# Patient Record
Sex: FEMALE | Race: WHITE | Employment: FULL TIME | URBAN - METROPOLITAN AREA
[De-identification: names, ages, dates, MRNs, and addresses within clinical notes are randomized per-mention and may not be internally consistent; named-entity substitution may affect disease eponyms.]

---

## 2017-03-03 ENCOUNTER — HOSPITAL ENCOUNTER (EMERGENCY)
Age: 47
Discharge: HOME | End: 2017-03-04
Attending: EMERGENCY MEDICINE
Payer: COMMERCIAL

## 2017-03-03 VITALS
OXYGEN SATURATION: 99 % | SYSTOLIC BLOOD PRESSURE: 114 MMHG | DIASTOLIC BLOOD PRESSURE: 79 MMHG | HEIGHT: 65 IN | BODY MASS INDEX: 20.83 KG/M2 | WEIGHT: 125 LBS | HEART RATE: 91 BPM | TEMPERATURE: 97.5 F

## 2017-03-03 DIAGNOSIS — K02.9 DENTAL CARIES INTO PULP: Primary | ICD-10-CM

## 2017-03-03 PROCEDURE — 6370000000 HC RX 637 (ALT 250 FOR IP): Performed by: EMERGENCY MEDICINE

## 2017-03-03 PROCEDURE — 99282 EMERGENCY DEPT VISIT SF MDM: CPT

## 2017-03-03 RX ORDER — HYDROCODONE BITARTRATE AND ACETAMINOPHEN 5; 325 MG/1; MG/1
1 TABLET ORAL EVERY 6 HOURS PRN
Qty: 20 TABLET | Refills: 0 | Status: SHIPPED | OUTPATIENT
Start: 2017-03-03 | End: 2017-03-10

## 2017-03-03 RX ORDER — IBUPROFEN 600 MG/1
600 TABLET ORAL ONCE
Status: COMPLETED | OUTPATIENT
Start: 2017-03-03 | End: 2017-03-03

## 2017-03-03 RX ORDER — CLINDAMYCIN HYDROCHLORIDE 150 MG/1
150 CAPSULE ORAL 3 TIMES DAILY
Qty: 21 CAPSULE | Refills: 0 | Status: SHIPPED | OUTPATIENT
Start: 2017-03-03 | End: 2017-03-13

## 2017-03-03 RX ADMIN — IBUPROFEN 600 MG: 600 TABLET ORAL at 23:59

## 2017-03-03 ASSESSMENT — PAIN SCALES - GENERAL
PAINLEVEL_OUTOF10: 9
PAINLEVEL_OUTOF10: 10

## 2017-03-03 ASSESSMENT — PAIN DESCRIPTION - LOCATION: LOCATION: TEETH

## 2017-03-03 ASSESSMENT — PAIN DESCRIPTION - DESCRIPTORS: DESCRIPTORS: ACHING;CONSTANT

## 2017-05-14 ENCOUNTER — APPOINTMENT (OUTPATIENT)
Dept: GENERAL RADIOLOGY | Age: 47
End: 2017-05-14
Payer: COMMERCIAL

## 2017-05-14 ENCOUNTER — HOSPITAL ENCOUNTER (EMERGENCY)
Age: 47
Discharge: HOME | End: 2017-05-14
Payer: COMMERCIAL

## 2017-05-14 VITALS
DIASTOLIC BLOOD PRESSURE: 94 MMHG | WEIGHT: 122 LBS | RESPIRATION RATE: 16 BRPM | BODY MASS INDEX: 20.33 KG/M2 | HEART RATE: 93 BPM | SYSTOLIC BLOOD PRESSURE: 145 MMHG | OXYGEN SATURATION: 96 % | HEIGHT: 65 IN | TEMPERATURE: 98 F

## 2017-05-14 DIAGNOSIS — M79.641 RIGHT HAND PAIN: Primary | ICD-10-CM

## 2017-05-14 DIAGNOSIS — S63.501A RIGHT WRIST SPRAIN, INITIAL ENCOUNTER: ICD-10-CM

## 2017-05-14 PROCEDURE — 99283 EMERGENCY DEPT VISIT LOW MDM: CPT

## 2017-05-14 PROCEDURE — 6370000000 HC RX 637 (ALT 250 FOR IP): Performed by: PHYSICIAN ASSISTANT

## 2017-05-14 PROCEDURE — 73130 X-RAY EXAM OF HAND: CPT

## 2017-05-14 PROCEDURE — 73110 X-RAY EXAM OF WRIST: CPT

## 2017-05-14 RX ORDER — IBUPROFEN 600 MG/1
600 TABLET ORAL ONCE
Qty: 15 TABLET | Refills: 0 | Status: SHIPPED | OUTPATIENT
Start: 2017-05-14 | End: 2019-05-15 | Stop reason: ALTCHOICE

## 2017-05-14 RX ORDER — IBUPROFEN 600 MG/1
600 TABLET ORAL ONCE
Status: COMPLETED | OUTPATIENT
Start: 2017-05-14 | End: 2017-05-14

## 2017-05-14 RX ADMIN — IBUPROFEN 600 MG: 600 TABLET ORAL at 14:13

## 2017-05-14 ASSESSMENT — PAIN DESCRIPTION - ORIENTATION: ORIENTATION: RIGHT

## 2017-05-14 ASSESSMENT — PAIN DESCRIPTION - FREQUENCY: FREQUENCY: CONTINUOUS

## 2017-05-14 ASSESSMENT — PAIN DESCRIPTION - LOCATION: LOCATION: WRIST

## 2017-05-14 ASSESSMENT — PAIN DESCRIPTION - DESCRIPTORS: DESCRIPTORS: THROBBING;BURNING;ACHING

## 2017-05-14 ASSESSMENT — ENCOUNTER SYMPTOMS
RESPIRATORY NEGATIVE: 1
EYES NEGATIVE: 1
GASTROINTESTINAL NEGATIVE: 1

## 2017-05-14 ASSESSMENT — PAIN SCALES - GENERAL
PAINLEVEL_OUTOF10: 9
PAINLEVEL_OUTOF10: 9

## 2017-05-14 ASSESSMENT — PAIN DESCRIPTION - PAIN TYPE: TYPE: ACUTE PAIN

## 2017-07-14 ENCOUNTER — HOSPITAL ENCOUNTER (OUTPATIENT)
Dept: WOMENS IMAGING | Age: 47
Discharge: HOME | End: 2017-07-14
Payer: COMMERCIAL

## 2017-07-14 DIAGNOSIS — Z12.31 ENCOUNTER FOR SCREENING MAMMOGRAM FOR BREAST CANCER: ICD-10-CM

## 2017-07-14 PROCEDURE — G0202 SCR MAMMO BI INCL CAD: HCPCS

## 2017-12-18 ENCOUNTER — APPOINTMENT (OUTPATIENT)
Dept: GENERAL RADIOLOGY | Age: 47
End: 2017-12-18
Payer: COMMERCIAL

## 2017-12-18 ENCOUNTER — HOSPITAL ENCOUNTER (EMERGENCY)
Age: 47
Discharge: HOME | End: 2017-12-18
Payer: COMMERCIAL

## 2017-12-18 VITALS
SYSTOLIC BLOOD PRESSURE: 133 MMHG | DIASTOLIC BLOOD PRESSURE: 78 MMHG | HEART RATE: 95 BPM | OXYGEN SATURATION: 97 % | RESPIRATION RATE: 16 BRPM | TEMPERATURE: 97.9 F

## 2017-12-18 DIAGNOSIS — S39.012A STRAIN OF LUMBAR REGION, INITIAL ENCOUNTER: Primary | ICD-10-CM

## 2017-12-18 PROCEDURE — 96372 THER/PROPH/DIAG INJ SC/IM: CPT

## 2017-12-18 PROCEDURE — 6360000002 HC RX W HCPCS: Performed by: PHYSICIAN ASSISTANT

## 2017-12-18 PROCEDURE — 73502 X-RAY EXAM HIP UNI 2-3 VIEWS: CPT

## 2017-12-18 PROCEDURE — 99283 EMERGENCY DEPT VISIT LOW MDM: CPT

## 2017-12-18 RX ORDER — ORPHENADRINE CITRATE 30 MG/ML
60 INJECTION INTRAMUSCULAR; INTRAVENOUS ONCE
Status: COMPLETED | OUTPATIENT
Start: 2017-12-18 | End: 2017-12-18

## 2017-12-18 RX ORDER — HYDROCODONE BITARTRATE AND ACETAMINOPHEN 5; 325 MG/1; MG/1
1 TABLET ORAL EVERY 6 HOURS PRN
Qty: 10 TABLET | Refills: 0 | Status: SHIPPED | OUTPATIENT
Start: 2017-12-18 | End: 2017-12-25

## 2017-12-18 RX ORDER — CYCLOBENZAPRINE HCL 10 MG
10 TABLET ORAL 3 TIMES DAILY PRN
Qty: 15 TABLET | Refills: 0 | Status: SHIPPED | OUTPATIENT
Start: 2017-12-18 | End: 2017-12-28

## 2017-12-18 RX ORDER — METHYLPREDNISOLONE ACETATE 80 MG/ML
80 INJECTION, SUSPENSION INTRA-ARTICULAR; INTRALESIONAL; INTRAMUSCULAR; SOFT TISSUE ONCE
Status: COMPLETED | OUTPATIENT
Start: 2017-12-18 | End: 2017-12-18

## 2017-12-18 RX ORDER — KETOROLAC TROMETHAMINE 30 MG/ML
60 INJECTION, SOLUTION INTRAMUSCULAR; INTRAVENOUS ONCE
Status: COMPLETED | OUTPATIENT
Start: 2017-12-18 | End: 2017-12-18

## 2017-12-18 RX ADMIN — KETOROLAC TROMETHAMINE 60 MG: 30 INJECTION, SOLUTION INTRAMUSCULAR at 20:05

## 2017-12-18 RX ADMIN — ORPHENADRINE CITRATE 60 MG: 30 INJECTION INTRAMUSCULAR; INTRAVENOUS at 20:04

## 2017-12-18 RX ADMIN — METHYLPREDNISOLONE ACETATE 80 MG: 80 INJECTION, SUSPENSION INTRA-ARTICULAR; INTRALESIONAL; INTRAMUSCULAR; SOFT TISSUE at 20:04

## 2017-12-18 ASSESSMENT — ENCOUNTER SYMPTOMS
COLOR CHANGE: 0
SHORTNESS OF BREATH: 0
TROUBLE SWALLOWING: 0
BACK PAIN: 1
EYE PAIN: 0
ABDOMINAL PAIN: 0
APNEA: 0
ALLERGIC/IMMUNOLOGIC NEGATIVE: 1

## 2017-12-18 ASSESSMENT — PAIN SCALES - GENERAL
PAINLEVEL_OUTOF10: 9
PAINLEVEL_OUTOF10: 9

## 2017-12-18 ASSESSMENT — PAIN DESCRIPTION - ORIENTATION: ORIENTATION: LOWER

## 2017-12-18 ASSESSMENT — PAIN DESCRIPTION - PAIN TYPE: TYPE: ACUTE PAIN

## 2017-12-18 ASSESSMENT — PAIN DESCRIPTION - LOCATION: LOCATION: BACK

## 2017-12-19 NOTE — ED PROVIDER NOTES
[facility] [facility] ED  eMERGENCY dEPARTMENT eNCOUnter      Pt Name: Moises Essex  MRN: [id number]  [place] 1970  Date of evaluation: 12/18/2017  Provider: Anni Galarza PA-C    [address] [place] [address]       Chief Complaint   Patient presents with    Back Pain     fell on ice hurt lower back          HISTORY OF PRESENT ILLNESS  (Location/Symptom, Timing/Onset, Context/Setting, Quality, Duration, Modifying Factors, Severity.)   Moises Essex is a 56 y.o. female who presents to the emergency department Status post mechanical fall on the ice prior to arrival.  Patient states that she was walking outside of her house and she slipped and fell, landing on her right lower back and hip region. Patient able ambulate after the injury. Patient states that pain syndrome the lower back and radiates into the abdomen and buttock region. Patient denies any numbness or tingling. Patient denies any loss of bowel or bladder control. HPI    Nursing Notes were reviewed and I agree. REVIEW OF SYSTEMS    (2-9 systems for level 4, 10 or more for level 5)     Review of Systems   Constitutional: Negative for diaphoresis and fever. HENT: Negative for hearing loss and trouble swallowing. Eyes: Negative for pain. Respiratory: Negative for apnea and shortness of breath. Cardiovascular: Negative for chest pain. Gastrointestinal: Negative for abdominal pain. Endocrine: Negative. Genitourinary: Negative for hematuria. Musculoskeletal: Positive for back pain. Negative for neck pain and neck stiffness. Skin: Negative for color change. Allergic/Immunologic: Negative. Neurological: Negative for dizziness and numbness. Hematological: Negative. Psychiatric/Behavioral: Negative. All other systems reviewed and are negative. Except as noted above the remainder of the review of systems was reviewed and negative.        PAST MEDICAL HISTORY     Past Medical History:   Diagnosis Date    Carpal tunnel tobacco: Never Used    Alcohol use Yes      Comment: occasionally    Drug use: No    Sexual activity: Not Currently     Partners: Male     Other Topics Concern    Not on file     Social History Narrative    No narrative on file       SCREENINGS           PHYSICAL EXAM    (up to 7 for level 4, 8 or more for level 5)     ED Triage Vitals [12/18/17 1936]   BP Temp Temp Source Pulse Resp SpO2 Height Weight   133/78 97.9 °F (36.6 °C) Oral 95 16 97 % -- --       Physical Exam   Constitutional: She is oriented to person, place, and time. She appears well-developed and well-nourished. No distress. HENT:   Head: Normocephalic and atraumatic. Mouth/Throat: No oropharyngeal exudate. Eyes: Conjunctivae and EOM are normal. Pupils are equal, round, and reactive to light. No scleral icterus. Neck: Normal range of motion. Neck supple. No tracheal deviation present. Cardiovascular: Normal rate, normal heart sounds and intact distal pulses. Pulmonary/Chest: Effort normal and breath sounds normal. No respiratory distress. Abdominal: Soft. Bowel sounds are normal. She exhibits no distension. Musculoskeletal: Normal range of motion. Lumbar back: She exhibits tenderness, pain and spasm. She exhibits no edema and no deformity. Back:    Neurological: She is alert and oriented to person, place, and time. No cranial nerve deficit. She exhibits normal muscle tone. Skin: Skin is warm and dry. No rash noted. She is not diaphoretic. No erythema. Psychiatric: She has a normal mood and affect. Her behavior is normal. Judgment and thought content normal.   Nursing note and vitals reviewed.         DIAGNOSTIC RESULTS     RADIOLOGY:   Non-plain film images such as CT, Ultrasound and MRI are read by the radiologist. Plain radiographic images are visualized and preliminarily interpreted by Jeevan Kyle PA-C with the below findings:        Interpretation per the Radiologist below, if available at the time of this note:    XR HIP 2-3 VW W PELVIS RIGHT    (Results Pending)       LABS:  Labs Reviewed - No data to display    All other labs were within normal range or not returned as of this dictation. EMERGENCY DEPARTMENT COURSE and DIFFERENTIAL DIAGNOSIS/MDM:   Vitals:    Vitals:    12/18/17 1936   BP: 133/78   Pulse: 95   Resp: 16   Temp: 97.9 °F (36.6 °C)   TempSrc: Oral   SpO2: 97%       REASSESSMENT     ED Course        No fx on xray. Will tx with muscle relaxants, pain control and PCP follow up. MDM    PROCEDURES:    Procedures      FINAL IMPRESSION      1. Strain of lumbar region, initial encounter          DISPOSITION/PLAN   DISPOSITION Decision to Discharge    PATIENT REFERRED TO:  [facility]  [address]  [phone]  Call in 1 day        DISCHARGE MEDICATIONS:  New Prescriptions    CYCLOBENZAPRINE (FLEXERIL) 10 MG TABLET    Take 1 tablet by mouth 3 times daily as needed for Muscle spasms    HYDROCODONE-ACETAMINOPHEN (NORCO) 5-325 MG PER TABLET    Take 1 tablet by mouth every 6 hours as needed for Pain .        (Please note that portions of this note were completed with a voice recognition program.  Efforts were made to edit the dictations but occasionally words are mis-transcribed.)    MARILYN Hanley PA-C  12/18/17 2047

## 2019-05-07 PROBLEM — M48.07 LUMBOSACRAL STENOSIS WITH NEUROGENIC CLAUDICATION (HCC): Status: ACTIVE | Noted: 2019-05-07

## 2019-05-07 PROBLEM — M50.223 HERNIATED NUCLEUS PULPOSUS, C6-7: Status: ACTIVE | Noted: 2019-05-07

## 2019-05-07 PROBLEM — G95.19 LUMBOSACRAL STENOSIS WITH NEUROGENIC CLAUDICATION (HCC): Status: ACTIVE | Noted: 2019-05-07

## 2019-05-07 PROBLEM — M47.23 OSTEOARTHRITIS OF SPINE WITH RADICULOPATHY, CERVICOTHORACIC REGION: Status: ACTIVE | Noted: 2019-05-07

## 2019-05-15 ENCOUNTER — HOSPITAL ENCOUNTER (EMERGENCY)
Age: 49
Discharge: HOME | End: 2019-05-15
Attending: EMERGENCY MEDICINE
Payer: COMMERCIAL

## 2019-05-15 ENCOUNTER — APPOINTMENT (OUTPATIENT)
Dept: GENERAL RADIOLOGY | Age: 49
End: 2019-05-15
Payer: COMMERCIAL

## 2019-05-15 VITALS
WEIGHT: 110 LBS | HEART RATE: 78 BPM | SYSTOLIC BLOOD PRESSURE: 118 MMHG | RESPIRATION RATE: 18 BRPM | HEIGHT: 63 IN | OXYGEN SATURATION: 100 % | BODY MASS INDEX: 19.49 KG/M2 | DIASTOLIC BLOOD PRESSURE: 68 MMHG | TEMPERATURE: 98 F

## 2019-05-15 DIAGNOSIS — S39.012A STRAIN OF LUMBAR REGION, INITIAL ENCOUNTER: Primary | ICD-10-CM

## 2019-05-15 PROCEDURE — 6360000002 HC RX W HCPCS: Performed by: EMERGENCY MEDICINE

## 2019-05-15 PROCEDURE — 96372 THER/PROPH/DIAG INJ SC/IM: CPT

## 2019-05-15 PROCEDURE — 99283 EMERGENCY DEPT VISIT LOW MDM: CPT

## 2019-05-15 PROCEDURE — 72110 X-RAY EXAM L-2 SPINE 4/>VWS: CPT

## 2019-05-15 RX ORDER — IBUPROFEN 600 MG/1
600 TABLET ORAL EVERY 8 HOURS PRN
Qty: 30 TABLET | Refills: 0 | Status: SHIPPED | OUTPATIENT
Start: 2019-05-15

## 2019-05-15 RX ORDER — CYCLOBENZAPRINE HCL 5 MG
5 TABLET ORAL 3 TIMES DAILY PRN
Qty: 12 TABLET | Refills: 0 | Status: SHIPPED | OUTPATIENT
Start: 2019-05-15

## 2019-05-15 RX ORDER — OXYCODONE HYDROCHLORIDE AND ACETAMINOPHEN 5; 325 MG/1; MG/1
1 TABLET ORAL EVERY 6 HOURS PRN
Qty: 10 TABLET | Refills: 0 | Status: SHIPPED | OUTPATIENT
Start: 2019-05-15 | End: 2019-05-18

## 2019-05-15 RX ORDER — KETOROLAC TROMETHAMINE 30 MG/ML
30 INJECTION, SOLUTION INTRAMUSCULAR; INTRAVENOUS ONCE
Status: COMPLETED | OUTPATIENT
Start: 2019-05-15 | End: 2019-05-15

## 2019-05-15 RX ADMIN — KETOROLAC TROMETHAMINE 30 MG: 30 INJECTION, SOLUTION INTRAMUSCULAR; INTRAVENOUS at 07:28

## 2019-05-15 ASSESSMENT — ENCOUNTER SYMPTOMS
ALLERGIC/IMMUNOLOGIC NEGATIVE: 1
WHEEZING: 0
SHORTNESS OF BREATH: 0
ABDOMINAL PAIN: 0
BACK PAIN: 1
NAUSEA: 0
VOMITING: 0
EYES NEGATIVE: 1

## 2019-05-15 ASSESSMENT — PAIN DESCRIPTION - PROGRESSION: CLINICAL_PROGRESSION: GRADUALLY WORSENING

## 2019-05-15 ASSESSMENT — PAIN SCALES - GENERAL
PAINLEVEL_OUTOF10: 9
PAINLEVEL_OUTOF10: 9

## 2019-05-15 ASSESSMENT — PAIN DESCRIPTION - ONSET: ONSET: ON-GOING

## 2019-05-15 ASSESSMENT — PAIN DESCRIPTION - PAIN TYPE: TYPE: ACUTE PAIN

## 2019-05-15 ASSESSMENT — PAIN DESCRIPTION - DESCRIPTORS: DESCRIPTORS: ACHING

## 2019-05-15 ASSESSMENT — PAIN DESCRIPTION - LOCATION: LOCATION: BACK;LEG

## 2019-05-15 ASSESSMENT — PAIN DESCRIPTION - FREQUENCY: FREQUENCY: INTERMITTENT

## 2019-05-15 ASSESSMENT — PAIN DESCRIPTION - ORIENTATION: ORIENTATION: RIGHT;LEFT

## 2019-05-15 NOTE — ED NOTES
Patient returned from x-ray. No distress noted. Given call light.  Sitting up in bed watching tv.      Brooke Peña RN  05/15/19 0373

## 2019-05-15 NOTE — ED NOTES
Registration at bedside. Patient then taken to x-ray via w/c. Changed into gown. Tolerated well.       Vincenzo Alvarado RN  05/15/19 8265

## 2019-05-15 NOTE — ED TRIAGE NOTES
Pt states lower right back pain x 2 weeks after falling on tile floor in bathroom. Pt landed on buttocks. No LOC. Denies hitting head. Tingling noted down right leg. Aleve last taken yesterday at 2200.
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
## 2019-05-28 ENCOUNTER — APPOINTMENT (OUTPATIENT)
Dept: GENERAL RADIOLOGY | Age: 49
End: 2019-05-28
Payer: COMMERCIAL

## 2019-05-28 ENCOUNTER — HOSPITAL ENCOUNTER (EMERGENCY)
Age: 49
Discharge: LEFT BEFORE DISCHARGE | End: 2019-05-28
Payer: COMMERCIAL

## 2019-05-28 VITALS
DIASTOLIC BLOOD PRESSURE: 85 MMHG | WEIGHT: 110 LBS | SYSTOLIC BLOOD PRESSURE: 124 MMHG | BODY MASS INDEX: 19.49 KG/M2 | RESPIRATION RATE: 16 BRPM | HEIGHT: 63 IN | OXYGEN SATURATION: 98 % | TEMPERATURE: 97.7 F | HEART RATE: 96 BPM

## 2019-05-28 DIAGNOSIS — G89.29 CHRONIC RIGHT-SIDED LOW BACK PAIN WITHOUT SCIATICA: Primary | ICD-10-CM

## 2019-05-28 DIAGNOSIS — M54.50 CHRONIC RIGHT-SIDED LOW BACK PAIN WITHOUT SCIATICA: Primary | ICD-10-CM

## 2019-05-28 PROCEDURE — 72170 X-RAY EXAM OF PELVIS: CPT

## 2019-05-28 PROCEDURE — 99284 EMERGENCY DEPT VISIT MOD MDM: CPT

## 2019-05-28 PROCEDURE — 72110 X-RAY EXAM L-2 SPINE 4/>VWS: CPT

## 2019-05-28 RX ORDER — DEXAMETHASONE SODIUM PHOSPHATE 10 MG/ML
10 INJECTION INTRAMUSCULAR; INTRAVENOUS ONCE
Status: DISCONTINUED | OUTPATIENT
Start: 2019-05-28 | End: 2019-05-28 | Stop reason: HOSPADM

## 2019-05-28 RX ORDER — KETOROLAC TROMETHAMINE 30 MG/ML
30 INJECTION, SOLUTION INTRAMUSCULAR; INTRAVENOUS ONCE
Status: DISCONTINUED | OUTPATIENT
Start: 2019-05-28 | End: 2019-05-28

## 2019-05-28 ASSESSMENT — PAIN DESCRIPTION - ORIENTATION: ORIENTATION: LOWER

## 2019-05-28 ASSESSMENT — ENCOUNTER SYMPTOMS
SORE THROAT: 0
ABDOMINAL DISTENTION: 0
SHORTNESS OF BREATH: 0
CONSTIPATION: 0
BACK PAIN: 1
RHINORRHEA: 0
EYE DISCHARGE: 0
ABDOMINAL PAIN: 0
COLOR CHANGE: 0

## 2019-05-28 ASSESSMENT — PAIN DESCRIPTION - FREQUENCY: FREQUENCY: CONTINUOUS

## 2019-05-28 ASSESSMENT — PAIN DESCRIPTION - PAIN TYPE: TYPE: ACUTE PAIN;CHRONIC PAIN

## 2019-05-28 ASSESSMENT — PAIN SCALES - GENERAL: PAINLEVEL_OUTOF10: 10

## 2019-05-28 ASSESSMENT — PAIN DESCRIPTION - DESCRIPTORS: DESCRIPTORS: SHARP

## 2019-05-28 ASSESSMENT — PAIN DESCRIPTION - LOCATION: LOCATION: BACK

## 2019-05-28 NOTE — ED NOTES
This nurse went in to medicate patient and patient was not in bed. Called cat scan and xray patient not there.  Patient left [facility], RN  05/28/19 8211

## 2019-05-28 NOTE — ED NOTES
Pt refused toradol as she states that the last time she received it, it made her worse. Will inform physician of this.      Sanjana GIANG RN  05/28/19 9721

## 2019-05-28 NOTE — ED TRIAGE NOTES
Pt reports low back pain since 5/1 after falling in the shower. Pt states she was treated in this ED on 5/15. Pt states she has not been able to get in to see her PMD and he told her to come to the ED. Pt rates back pain \"10/10\". Pt ambulatory to triage with steady gait, pt denies urinary symptoms. Pt alert and oriented, skin p/w/d, resps even and unlabored.   No distress noted

## 2019-05-28 NOTE — ED PROVIDER NOTES
[address] [facility] ED  eMERGENCY dEPARTMENT eNCOUnter      Pt Name: Redd Greene  MRN: [id number]  Dayangfmicheline 1970  Date of evaluation: 5/28/2019  Provider: Javier Lewis PA-C    CHIEF COMPLAINT       Chief Complaint   Patient presents with    Back Pain         HISTORY OF PRESENT ILLNESS   (Location/Symptom, Timing/Onset,Context/Setting, Quality, Duration, Modifying Factors, Severity)  Note limiting factors. Redd Greene is a 51 y.o. female who presents to the emergency department with complaint of low back pain secondary to a fall which patient states occurred on 5/15/2019. Patient states she was seen in the emergency department she was diagnosed as a lumbar contusion she was sent home with pain medication and advised to follow-up with her regular family physician. She states some chronic back pain which has been managed by Dr. Susan Hensley of neurosurgery as well as pain management. She states she has been seen and evaluated by pain management on 5/21/2019 but she states this for upper part of the back and not the lower part of the back, this was with her pain management specialist. and Dr. Susan Hensley. She states that she had fired both of their services and wants to follow-up with someone different. She states she had contacted her regular family physician for follow-up visit but was unable to get scheduled. She states that her regular family physician advised that if she was still having pain to be re-seen and reevaluated in the emergency department. Her current pain at this time as a 10 out of 10 to the low back and right paraspinal region. she states there is no radiation of pain into her buttock or lower extremities. Denies any bowel or bladder dysfunction she denies any saddle paresthesias. She denies any new or acute injury. sHe has past medical history significant for chronic back pain,  fibromyalgia, chronic low back pain, and restless leg syndrome, and degeneration of cervical disc. Pt drove herself to ER. HPI    NursingNotes were reviewed. REVIEW OF SYSTEMS    (2-9 systems for level 4, 10 or more for level 5)     Review of Systems   Constitutional: Negative for activity change and appetite change. HENT: Negative for congestion, ear discharge, ear pain, nosebleeds, rhinorrhea and sore throat. Eyes: Negative for discharge. Respiratory: Negative for shortness of breath. Cardiovascular: Negative for chest pain, palpitations and leg swelling. Gastrointestinal: Negative for abdominal distention, abdominal pain and constipation. Genitourinary: Negative for difficulty urinating and dysuria. Musculoskeletal: Positive for back pain. Negative for arthralgias. Lumbar back pain. Skin: Negative for color change. Neurological: Negative for dizziness, syncope, numbness and headaches. Psychiatric/Behavioral: Negative for agitation and confusion. Except as noted above the remainder of the review of systems was reviewed and negative.        PAST MEDICAL HISTORY     Past Medical History:   Diagnosis Date    Carpal tunnel syndrome on right     Cervical radiculopathy at C6     Chronic back pain     Chronic bronchitis (HCC)     Chronic pain syndrome     Degeneration of cervical intervertebral disc 9/8/2015    Depression     Fibromyalgia     BILATERAL TRAPEZIAL    Fibromyalgia     Headache(784.0)     Low back pain     Restless legs syndrome     Tobacco use disorder          SURGICALHISTORY       Past Surgical History:   Procedure Laterality Date    HYSTERECTOMY  2004         CURRENT MEDICATIONS       Discharge Medication List as of 5/28/2019  8:34 AM      CONTINUE these medications which have NOT CHANGED    Details   cyclobenzaprine (FLEXERIL) 5 MG tablet Take 1 tablet by mouth 3 times daily as needed for Muscle spasms, Disp-12 tablet, R-0Print      ibuprofen (ADVIL;MOTRIN) 600 MG tablet Take 1 tablet by mouth every 8 hours as needed for Pain, Disp-30 tablet, R-0Print      LYRICA 150 MG capsule take 1 capsule by mouth three times a day, Disp-90 capsule, R-5      albuterol (VENTOLIN HFA) 108 (90 BASE) MCG/ACT inhaler Inhale 2 puffs into the lungs every 6 hours as needed for Wheezing., Disp-3 Inhaler, R-3      lidocaine (LIDODERM) 5 % Apply up to 3 patches to painful areas daily. Generic equivalent acceptable, unless otherwise noted. , Disp-90 patch, R-3      fluticasone (FLONASE) 50 MCG/ACT nasal spray 2 sprays each nostril daily. , Disp-1 Bottle, R-1             ALLERGIES     Hydrocodone; Penicillins; and Tramadol    FAMILY HISTORY       Family History   Problem Relation Age of Onset    Arthritis Mother 40    Cancer Mother     Diabetes Mother     Heart Disease Father     Stroke Father     Heart Attack Maternal Aunt             Diabetes Brother     Hypertension Brother     Hypertension Sister 61    Cancer Maternal Grandmother 40    Cancer Maternal Uncle     Heart Disease Paternal Grandmother     Heart Disease Paternal Aunt     High Blood Pressure Sister           SOCIAL HISTORY       Social History     Socioeconomic History    Marital status: Single     Spouse name: None    Number of children: None    Years of education: None    Highest education level: None   Occupational History    None   Social Needs    Financial resource strain: None    Food insecurity:     Worry: None     Inability: None    Transportation needs:     Medical: None     Non-medical: None   Tobacco Use    Smoking status: Current Every Day Smoker     Packs/day: 1.00     Years: 25.00     Pack years: 25.00     Types: Cigarettes    Smokeless tobacco: Never Used   Substance and Sexual Activity    Alcohol use: Not Currently     Comment: occasionally    Drug use: No    Sexual activity: Not Currently     Partners: Male   Lifestyle    Physical activity:     Days per week: None     Minutes per session: None    Stress: None   Relationships    Social connections:     Talks on denies any new injury since her previous visit here in the emergency Department. An x-ray was completed of the pelvis which shows no acute fracture subluxation and a repeat x-ray of the lumbar spine was completed as well, this does show some concerns for a fracture of the mid sacrum which was not seen previously on x-ray of 2 weeks ago. CT scan of the pelvis and sacrum was ordered, and prior to the patient completing the study she eloped from the emergency department. She had not to voice any concern for wanting to leave the ER so is unclear why she left without completing services. CRITICAL CARE TIME   Total Critical Care time was 0 minutes, excluding separately reportableprocedures. There was a high probability of clinicallysignificant/life threatening deterioration in the patient's condition which required my urgent intervention. CONSULTS:  None    PROCEDURES:  Unless otherwise noted below, none     Procedures    FINAL IMPRESSION      1. Chronic right-sided low back pain without sciatica          DISPOSITION/PLAN   DISPOSITION Eloped - Left Before Treatment Complete 05/28/2019 08:32:34 AM      PATIENT REFERRED TO:  No follow-up provider specified.     DISCHARGE MEDICATIONS:  Discharge Medication List as of 5/28/2019  8:34 AM             (Please note that portions of this note were completed with a voice recognition program.  Efforts were made to edit the dictations but occasionally words are mis-transcribed.)    Jody Buenrostro PA-C (electronically signed)  Attending Emergency Physician         Jody Buenrostro PA-C  05/28/19 [id number]

## 2019-05-28 NOTE — ED NOTES
Pt states that she has numbness and tingling to the BLE which started a week after she fell in the shower around the first week of May. Push and pulls weak in BLE. Pt ambulates well with steady gait. Pt c/o pain to the L/S and right hip. No swelling noted.  Pedal pulses strong and equal.      Cesar Vail V RN  05/28/19 1648

## 2023-04-08 PROBLEM — E78.5 HYPERLIPIDEMIA: Status: ACTIVE | Noted: 2023-04-08

## 2023-04-08 PROBLEM — I10 BENIGN ESSENTIAL HYPERTENSION: Status: ACTIVE | Noted: 2023-04-08

## 2023-04-08 PROBLEM — G89.4 CHRONIC PAIN SYNDROME: Status: ACTIVE | Noted: 2023-04-08

## 2023-04-08 PROBLEM — R63.4 UNINTENTIONAL WEIGHT LOSS: Status: ACTIVE | Noted: 2023-04-08

## 2023-04-08 PROBLEM — G62.9 NEUROPATHY: Status: ACTIVE | Noted: 2023-04-08

## 2023-04-08 PROBLEM — I25.10 CORONARY ARTERY DISEASE: Status: ACTIVE | Noted: 2023-04-08

## 2023-04-08 PROBLEM — E55.9 VITAMIN D DEFICIENCY: Status: ACTIVE | Noted: 2023-04-08

## 2023-04-08 PROBLEM — M54.9 BACK PAIN: Status: ACTIVE | Noted: 2023-04-08

## 2023-04-08 PROBLEM — R10.2 PELVIC PAIN IN FEMALE: Status: ACTIVE | Noted: 2023-04-08

## 2023-04-08 PROBLEM — J44.9 COPD (CHRONIC OBSTRUCTIVE PULMONARY DISEASE) (MULTI): Status: ACTIVE | Noted: 2023-04-08

## 2023-04-08 PROBLEM — R39.11 URINARY HESITANCY: Status: ACTIVE | Noted: 2023-04-08

## 2023-04-08 RX ORDER — LIDOCAINE 50 MG/G
1 PATCH TOPICAL
COMMUNITY
Start: 2019-05-30

## 2023-04-08 RX ORDER — METHADONE HYDROCHLORIDE 10 MG/1
1 TABLET ORAL DAILY
COMMUNITY
Start: 2020-11-05

## 2023-04-08 RX ORDER — ALBUTEROL SULFATE 90 UG/1
AEROSOL, METERED RESPIRATORY (INHALATION) 4 TIMES DAILY PRN
COMMUNITY
Start: 2018-10-29

## 2023-04-08 RX ORDER — TIOTROPIUM BROMIDE INHALATION SPRAY 3.12 UG/1
2 SPRAY, METERED RESPIRATORY (INHALATION) DAILY
COMMUNITY
Start: 2020-11-05

## 2023-04-08 RX ORDER — CLINDAMYCIN HYDROCHLORIDE 300 MG/1
1 CAPSULE ORAL EVERY 6 HOURS
COMMUNITY
Start: 2022-03-30

## 2023-04-08 RX ORDER — IPRATROPIUM BROMIDE AND ALBUTEROL SULFATE 2.5; .5 MG/3ML; MG/3ML
SOLUTION RESPIRATORY (INHALATION) 4 TIMES DAILY
COMMUNITY
Start: 2018-10-29

## 2023-04-08 RX ORDER — METOPROLOL SUCCINATE 50 MG/1
1 TABLET, EXTENDED RELEASE ORAL DAILY
COMMUNITY
Start: 2018-10-27

## 2023-04-10 ENCOUNTER — APPOINTMENT (OUTPATIENT)
Dept: PRIMARY CARE | Facility: CLINIC | Age: 53
End: 2023-04-10
Payer: MEDICAID

## 2023-04-25 ENCOUNTER — OFFICE VISIT (OUTPATIENT)
Dept: PRIMARY CARE | Facility: CLINIC | Age: 53
End: 2023-04-25
Payer: MEDICAID

## 2023-04-25 VITALS
DIASTOLIC BLOOD PRESSURE: 66 MMHG | SYSTOLIC BLOOD PRESSURE: 100 MMHG | BODY MASS INDEX: 23.95 KG/M2 | TEMPERATURE: 97.4 F | OXYGEN SATURATION: 93 % | WEIGHT: 140.3 LBS | HEART RATE: 112 BPM | HEIGHT: 64 IN

## 2023-04-25 DIAGNOSIS — J44.9 CHRONIC OBSTRUCTIVE PULMONARY DISEASE, UNSPECIFIED COPD TYPE (MULTI): Primary | ICD-10-CM

## 2023-04-25 DIAGNOSIS — L60.9 NAIL PROBLEM: ICD-10-CM

## 2023-04-25 DIAGNOSIS — I10 BENIGN ESSENTIAL HYPERTENSION: ICD-10-CM

## 2023-04-25 DIAGNOSIS — Z12.31 ENCOUNTER FOR SCREENING MAMMOGRAM FOR MALIGNANT NEOPLASM OF BREAST: ICD-10-CM

## 2023-04-25 DIAGNOSIS — R53.83 FATIGUE, UNSPECIFIED TYPE: ICD-10-CM

## 2023-04-25 DIAGNOSIS — E78.5 HYPERLIPIDEMIA, UNSPECIFIED HYPERLIPIDEMIA TYPE: ICD-10-CM

## 2023-04-25 DIAGNOSIS — E55.9 VITAMIN D DEFICIENCY: ICD-10-CM

## 2023-04-25 PROCEDURE — 99213 OFFICE O/P EST LOW 20 MIN: CPT | Performed by: FAMILY MEDICINE

## 2023-04-25 PROCEDURE — 3074F SYST BP LT 130 MM HG: CPT | Performed by: FAMILY MEDICINE

## 2023-04-25 PROCEDURE — 3078F DIAST BP <80 MM HG: CPT | Performed by: FAMILY MEDICINE

## 2023-04-25 RX ORDER — PREGABALIN 150 MG/1
1 CAPSULE ORAL 3 TIMES DAILY
COMMUNITY
Start: 2015-02-14

## 2023-04-25 RX ORDER — TIOTROPIUM BROMIDE AND OLODATEROL 3.124; 2.736 UG/1; UG/1
SPRAY, METERED RESPIRATORY (INHALATION)
COMMUNITY

## 2023-04-25 RX ORDER — PREDNISONE 5 MG/1
1 TABLET ORAL DAILY
COMMUNITY
Start: 2023-04-18

## 2023-04-25 RX ORDER — AZITHROMYCIN 250 MG/1
TABLET, FILM COATED ORAL
COMMUNITY
Start: 2023-04-18

## 2023-04-25 NOTE — PROGRESS NOTES
"Subjective   Patient ID: Radha Locke is a 53 y.o. female who presents for Establish Care, Annual Exam, COPD, and Nail Problem.  HPI  Est care   Patients former pcp Dr. Chacon  Patient has not seen Dr. Atknis  Patients father was a pt of Dr. Atkins     Annual physical   Eats a generally unhealthy diet   Exercises   Denies any chest pain,  Admits to SOB (Due to COPD)  Under the care of Dr. Sy (Pulmonology)  No Abdominal pain   No black or bloody stools   Urination/BM normal   Last eye apt x over 1 year    Patient presents for Fibromyalgia  Is currently taking Lyrica   Rates the pain a 6-7/10 over the past 7 days   Reports that the medication gives  40-50 % pain control/relief   [place] reviewed today  CSA 04/25/2023  Last took x over 1 month  Patient is trying to not take this as often    Nail problem  Bilateral feet   Admits to difficulties clipping her toe nails  These grow quickly  Also admits to discoloration  Would like podiatry referral      Sees Dr. Sy for pulmonary    ROS  Constitutional- No activity change. No appetite change.  Eyes- Denies vision changes.  Respiratory-shortness of breath with exertion.  On home oxygen  Cardiovascular- No palpitations. No chest pain.  GI- No nausea or vomiting. No diarrhea or constipation. Denies abdominal pain.  Musculoskeletal- Denies joint swelling.  Extremities- No edema.  Neurological- Denies headaches. Denies dizziness.  Skin- No rashes.  Psychiatric/Behavioral- Denies significant anxiety, or depressed mood.     Objective     /66   Pulse (!) 112   Temp 36.3 °C (97.4 °F) (Temporal)   Ht 1.626 m (5' 4\")   Wt 63.6 kg (140 lb 4.8 oz)   SpO2 93%   BMI 24.08 kg/m²     Allergies   Allergen Reactions    Hydrocodone-Acetaminophen Unknown    Penicillins Unknown    Tramadol Unknown       Constitutional-- Well-nourished.  No distress  Head- unremarkable.  Eyes- PERRL.  Conjunctiva normal.  Nose- Normal.  No rhinorrhea noted.  Throat- Oropharynx is clear and " moist.  Neck- Supple with no thyromegaly.  No significant cervical adenopathy noted.  Pulmonary/Chest-diminished breath sounds consistent with COPD  Heart- Regular rate and rhythm.  No murmur.  Abdomen- Soft and non-tender.  No masses noted.  Musculoskeletal- Normal ROM.  No significant joint swelling  Extremities- No edema.   Neurological- Alert.  No noted deficits.  Skin- Warm.  No rashes.  Psychiatric/Behavioral- Mood and affect normal.  Behavior normal.     Assessment/Plan   1. Chronic obstructive pulmonary disease, unspecified COPD type (CMS/HCC)  Comprehensive Metabolic Panel    CBC and Auto Differential      2. Encounter for screening mammogram for malignant neoplasm of breast  BI mammo bilateral screening tomosynthesis    BI mammo bilateral screening tomosynthesis      3. Hyperlipidemia, unspecified hyperlipidemia type        4. Nail problem  Referral to Podiatry      5. Benign essential hypertension        6. Fatigue, unspecified type  Comprehensive Metabolic Panel    CBC and Auto Differential    Thyroid Stimulating Hormone    Thyroxine, Free      7. Vitamin D deficiency  Vitamin D, Total           Long talk. Treatment options reviewed.  Mood stable  Hypertension controlled  Cholesterol controlled  Thyroid function stable  COPD controlled  Treatment options for quitting smoking discussed.  She declines at this time.    Labs as ordered     Continue and take your medications as prescribed.  Take the least amount of medication required for symptom control     Health Maintenance issues discussed.    Importance of healthy diet and regular exercise regimen discussed.    We will contact you with any test results ordered. If you do not hear from us, please contact.    Follow-up as instructed or sooner if any problems or symptoms do not resolve as expected.    Scribe Attestation  By signing my name below, Itz YANG Scribe   attest that this documentation has been prepared under the direction and in the  presence of Jimmie Atkins MD.

## 2023-05-16 ENCOUNTER — APPOINTMENT (OUTPATIENT)
Dept: PRIMARY CARE | Facility: CLINIC | Age: 53
End: 2023-05-16
Payer: MEDICAID

## 2023-05-26 ENCOUNTER — APPOINTMENT (OUTPATIENT)
Dept: PRIMARY CARE | Facility: CLINIC | Age: 53
End: 2023-05-26
Payer: MEDICAID

## 2023-06-01 ENCOUNTER — LAB (OUTPATIENT)
Dept: LAB | Facility: LAB | Age: 53
End: 2023-06-01
Payer: MEDICAID

## 2023-06-01 DIAGNOSIS — R53.83 FATIGUE, UNSPECIFIED TYPE: ICD-10-CM

## 2023-06-01 DIAGNOSIS — E55.9 VITAMIN D DEFICIENCY: ICD-10-CM

## 2023-06-01 DIAGNOSIS — J44.9 CHRONIC OBSTRUCTIVE PULMONARY DISEASE, UNSPECIFIED COPD TYPE (MULTI): ICD-10-CM

## 2023-06-01 LAB
ALANINE AMINOTRANSFERASE (SGPT) (U/L) IN SER/PLAS: 32 U/L (ref 7–45)
ALBUMIN (G/DL) IN SER/PLAS: 4.1 G/DL (ref 3.4–5)
ALKALINE PHOSPHATASE (U/L) IN SER/PLAS: 85 U/L (ref 33–110)
ANION GAP IN SER/PLAS: 13 MMOL/L (ref 10–20)
ASPARTATE AMINOTRANSFERASE (SGOT) (U/L) IN SER/PLAS: 25 U/L (ref 9–39)
BASOPHILS (10*3/UL) IN BLOOD BY AUTOMATED COUNT: 0.09 X10E9/L (ref 0–0.1)
BASOPHILS/100 LEUKOCYTES IN BLOOD BY AUTOMATED COUNT: 1.2 % (ref 0–2)
BILIRUBIN TOTAL (MG/DL) IN SER/PLAS: 0.8 MG/DL (ref 0–1.2)
CALCIDIOL (25 OH VITAMIN D3) (NG/ML) IN SER/PLAS: 40 NG/ML
CALCIUM (MG/DL) IN SER/PLAS: 9.5 MG/DL (ref 8.6–10.3)
CARBON DIOXIDE, TOTAL (MMOL/L) IN SER/PLAS: 42 MMOL/L (ref 21–32)
CHLORIDE (MMOL/L) IN SER/PLAS: 89 MMOL/L (ref 98–107)
CREATININE (MG/DL) IN SER/PLAS: 0.58 MG/DL (ref 0.5–1.05)
EOSINOPHILS (10*3/UL) IN BLOOD BY AUTOMATED COUNT: 0.13 X10E9/L (ref 0–0.7)
EOSINOPHILS/100 LEUKOCYTES IN BLOOD BY AUTOMATED COUNT: 1.8 % (ref 0–6)
ERYTHROCYTE DISTRIBUTION WIDTH (RATIO) BY AUTOMATED COUNT: 18.3 % (ref 11.5–14.5)
ERYTHROCYTE MEAN CORPUSCULAR HEMOGLOBIN CONCENTRATION (G/DL) BY AUTOMATED: 31 G/DL (ref 32–36)
ERYTHROCYTE MEAN CORPUSCULAR VOLUME (FL) BY AUTOMATED COUNT: 92 FL (ref 80–100)
ERYTHROCYTES (10*6/UL) IN BLOOD BY AUTOMATED COUNT: 7.17 X10E12/L (ref 4–5.2)
GFR FEMALE: >90 ML/MIN/1.73M2
GLUCOSE (MG/DL) IN SER/PLAS: 83 MG/DL (ref 74–99)
HEMATOCRIT (%) IN BLOOD BY AUTOMATED COUNT: 65.9 % (ref 36–46)
HEMOGLOBIN (G/DL) IN BLOOD: 20.4 G/DL (ref 12–16)
IMMATURE GRANULOCYTES/100 LEUKOCYTES IN BLOOD BY AUTOMATED COUNT: 0.5 % (ref 0–0.9)
LEUKOCYTES (10*3/UL) IN BLOOD BY AUTOMATED COUNT: 7.4 X10E9/L (ref 4.4–11.3)
LYMPHOCYTES (10*3/UL) IN BLOOD BY AUTOMATED COUNT: 2.18 X10E9/L (ref 1.2–4.8)
LYMPHOCYTES/100 LEUKOCYTES IN BLOOD BY AUTOMATED COUNT: 29.6 % (ref 13–44)
MONOCYTES (10*3/UL) IN BLOOD BY AUTOMATED COUNT: 0.85 X10E9/L (ref 0.1–1)
MONOCYTES/100 LEUKOCYTES IN BLOOD BY AUTOMATED COUNT: 11.5 % (ref 2–10)
NEUTROPHILS (10*3/UL) IN BLOOD BY AUTOMATED COUNT: 4.07 X10E9/L (ref 1.2–7.7)
NEUTROPHILS/100 LEUKOCYTES IN BLOOD BY AUTOMATED COUNT: 55.4 % (ref 40–80)
PLATELETS (10*3/UL) IN BLOOD AUTOMATED COUNT: 111 X10E9/L (ref 150–450)
POTASSIUM (MMOL/L) IN SER/PLAS: 3.9 MMOL/L (ref 3.5–5.3)
PROTEIN TOTAL: 7.1 G/DL (ref 6.4–8.2)
SODIUM (MMOL/L) IN SER/PLAS: 140 MMOL/L (ref 136–145)
THYROTROPIN (MIU/L) IN SER/PLAS BY DETECTION LIMIT <= 0.05 MIU/L: 2.11 MIU/L (ref 0.44–3.98)
THYROXINE (T4) FREE (NG/DL) IN SER/PLAS: 1.23 NG/DL (ref 0.61–1.12)
UREA NITROGEN (MG/DL) IN SER/PLAS: 22 MG/DL (ref 6–23)

## 2023-06-01 PROCEDURE — 85025 COMPLETE CBC W/AUTO DIFF WBC: CPT

## 2023-06-01 PROCEDURE — 84443 ASSAY THYROID STIM HORMONE: CPT

## 2023-06-01 PROCEDURE — 80053 COMPREHEN METABOLIC PANEL: CPT

## 2023-06-01 PROCEDURE — 82306 VITAMIN D 25 HYDROXY: CPT

## 2023-06-01 PROCEDURE — 36415 COLL VENOUS BLD VENIPUNCTURE: CPT

## 2023-06-01 PROCEDURE — 84439 ASSAY OF FREE THYROXINE: CPT

## 2023-06-20 ENCOUNTER — OFFICE VISIT (OUTPATIENT)
Dept: PRIMARY CARE | Facility: CLINIC | Age: 53
End: 2023-06-20
Payer: MEDICAID

## 2023-06-20 VITALS
OXYGEN SATURATION: 98 % | DIASTOLIC BLOOD PRESSURE: 70 MMHG | BODY MASS INDEX: 24.28 KG/M2 | RESPIRATION RATE: 18 BRPM | HEIGHT: 64 IN | SYSTOLIC BLOOD PRESSURE: 120 MMHG | TEMPERATURE: 98.1 F | WEIGHT: 142.25 LBS | HEART RATE: 75 BPM

## 2023-06-20 DIAGNOSIS — D58.2 ELEVATED HEMOGLOBIN (CMS-HCC): ICD-10-CM

## 2023-06-20 DIAGNOSIS — G89.4 CHRONIC PAIN SYNDROME: ICD-10-CM

## 2023-06-20 DIAGNOSIS — Z72.0 TOBACCO ABUSE DISORDER: ICD-10-CM

## 2023-06-20 DIAGNOSIS — I10 BENIGN ESSENTIAL HYPERTENSION: ICD-10-CM

## 2023-06-20 DIAGNOSIS — T40.2X1A POISONING BY OTHER OPIOIDS, ACCIDENTAL (UNINTENTIONAL), INITIAL ENCOUNTER (MULTI): ICD-10-CM

## 2023-06-20 DIAGNOSIS — J44.9 CHRONIC OBSTRUCTIVE PULMONARY DISEASE, UNSPECIFIED COPD TYPE (MULTI): Primary | ICD-10-CM

## 2023-06-20 DIAGNOSIS — G62.9 NEUROPATHY: ICD-10-CM

## 2023-06-20 PROCEDURE — 3074F SYST BP LT 130 MM HG: CPT | Performed by: FAMILY MEDICINE

## 2023-06-20 PROCEDURE — 99213 OFFICE O/P EST LOW 20 MIN: CPT | Performed by: FAMILY MEDICINE

## 2023-06-20 PROCEDURE — 3078F DIAST BP <80 MM HG: CPT | Performed by: FAMILY MEDICINE

## 2023-06-20 RX ORDER — ASPIRIN/CALCIUM CARB/MAGNESIUM 325 MG
4 TABLET ORAL EVERY 2 HOUR PRN
Qty: 72 LOZENGE | Refills: 2 | Status: SHIPPED | OUTPATIENT
Start: 2023-06-20 | End: 2023-07-20

## 2023-06-20 NOTE — PROGRESS NOTES
"Subjective   Patient ID: Radha Locke is a 53 y.o. female who presents for Hypertension and Results.  HPI    HTN follow up  Denies chest pain,SOB  Denies any swelling, headaches, lightheadedness or dizziness  Eats a generally healthy diet  Exercises  Does check BP at home  Currently taking metoprolol     Patient would like to go over BW from 6-1-23.    ROS  Constitutional- No activity change. No appetite change.  Eyes- Denies vision changes.  Respiratory- No shortness of breath.  Cardiovascular- No palpitations. No chest pain.  GI- No nausea or vomiting. No diarrhea or constipation. Denies abdominal pain.  Musculoskeletal- Denies joint swelling.  Extremities- No edema.  Neurological- Denies headaches. Denies dizziness.  Skin- No rashes.  Psychiatric/Behavioral- Denies significant anxiety, or depressed mood.     Objective     /70 (BP Location: Left arm, Patient Position: Sitting, BP Cuff Size: Adult)   Pulse 75   Temp 36.7 °C (98.1 °F) (Temporal)   Resp 18   Ht 1.626 m (5' 4\")   Wt 64.5 kg (142 lb 4 oz)   SpO2 98%   BMI 24.42 kg/m²     Allergies   Allergen Reactions    Hydrocodone-Acetaminophen Unknown    Penicillins Unknown    Tramadol Unknown       Constitutional-- Well-nourished.  No distress  Head- unremarkable.  Eyes- PERRL.  Conjunctiva normal.  Nose- Normal.  No rhinorrhea noted.  Throat- Oropharynx is clear and moist.  Neck- Supple with no thyromegaly.  No significant cervical adenopathy noted.  Pulmonary/Chest-decreased breath sounds consistent with COPD.  Heart- Regular rate and rhythm.  No murmur.  Abdomen- Soft and non-tender.  No masses noted.  Musculoskeletal- Normal ROM.  No significant joint swelling  Extremities- No edema.   Neurological- Alert.  No noted deficits.  Skin- Warm.  No rashes.  Psychiatric/Behavioral- Mood and affect normal.  Behavior normal.     Assessment/Plan   1. Chronic obstructive pulmonary disease, unspecified COPD type (CMS/HCC)  CBC and Auto Differential      2. " Benign essential hypertension  CBC and Auto Differential      3. Elevated hemoglobin (CMS/HCC)  CBC and Auto Differential      4. Tobacco abuse disorder  nicotine polacrilex (Nicorette) 4 mg lozenge      5. Poisoning by other opioids, accidental (unintentional), initial encounter (CMS/HCC)        6. Chronic pain syndrome        7. Neuropathy               Long talk. Treatment options reviewed.  Mood stable  Hypertension controlled  COPD controlled  Thyroid function stable    CVA prevention:  Start ASA 81 mg daily    Polycythemia likely transient secondary to steroid use:  Re-check levels in 2-3 weeks - lab requisition provided.     Smoking cessation:  Provided reassurance/counseling  Start Nicorette lozenges     Continue and take your medications as prescribed.  Take the least amount of medication required for symptom control     Health Maintenance issues discussed.    Importance of healthy diet and regular exercise regimen discussed.    We will contact you with any test results ordered. If you do not hear from us, please contact.    Follow-up as instructed or sooner if any problems or symptoms do not resolve as expected.    Scribe Attestation  By signing my name below, IItz Scribe   attest that this documentation has been prepared under the direction and in the presence of Jimmie Atkins MD.

## 2023-10-21 ENCOUNTER — APPOINTMENT (OUTPATIENT)
Dept: CARDIOLOGY | Facility: HOSPITAL | Age: 53
DRG: 917 | End: 2023-10-21
Payer: MEDICAID

## 2023-10-21 ENCOUNTER — APPOINTMENT (OUTPATIENT)
Dept: RADIOLOGY | Facility: HOSPITAL | Age: 53
DRG: 917 | End: 2023-10-21
Payer: MEDICAID

## 2023-10-21 ENCOUNTER — HOSPITAL ENCOUNTER (INPATIENT)
Facility: HOSPITAL | Age: 53
LOS: 1 days | Discharge: LEFT BEFORE DISCHARGE | DRG: 917 | End: 2023-10-22
Attending: EMERGENCY MEDICINE | Admitting: SURGERY
Payer: MEDICAID

## 2023-10-21 DIAGNOSIS — J96.01 ACUTE RESPIRATORY FAILURE WITH HYPOXIA (MULTI): Primary | ICD-10-CM

## 2023-10-21 DIAGNOSIS — R40.0 SOMNOLENCE: ICD-10-CM

## 2023-10-21 PROBLEM — T40.601A: Status: ACTIVE | Noted: 2023-10-21

## 2023-10-21 LAB
ALBUMIN SERPL BCP-MCNC: 4.4 G/DL (ref 3.4–5)
ALP SERPL-CCNC: 72 U/L (ref 33–110)
ALT SERPL W P-5'-P-CCNC: 27 U/L (ref 7–45)
AMMONIA PLAS-SCNC: 30 UMOL/L (ref 16–53)
AMPHETAMINES UR QL SCN: ABNORMAL
ANION GAP BLDA CALCULATED.4IONS-SCNC: -2 MMO/L (ref 10–25)
ANION GAP BLDA CALCULATED.4IONS-SCNC: 0 MMO/L (ref 10–25)
ANION GAP SERPL CALC-SCNC: 12 MMOL/L (ref 10–20)
APPARATUS: ABNORMAL
APPARATUS: ABNORMAL
AST SERPL W P-5'-P-CCNC: 21 U/L (ref 9–39)
BARBITURATES UR QL SCN: ABNORMAL
BASE EXCESS BLDA CALC-SCNC: 14 MMOL/L (ref -2–3)
BASE EXCESS BLDA CALC-SCNC: 9.3 MMOL/L (ref -2–3)
BASOPHILS # BLD AUTO: 0.15 X10*3/UL (ref 0–0.1)
BASOPHILS NFR BLD AUTO: 0.9 %
BENZODIAZ UR QL SCN: ABNORMAL
BILIRUB SERPL-MCNC: 0.5 MG/DL (ref 0–1.2)
BODY TEMPERATURE: ABNORMAL
BODY TEMPERATURE: ABNORMAL
BUN SERPL-MCNC: 17 MG/DL (ref 6–23)
BZE UR QL SCN: ABNORMAL
CA-I BLDA-SCNC: 1.08 MMOL/L (ref 1.1–1.33)
CA-I BLDA-SCNC: 1.2 MMOL/L (ref 1.1–1.33)
CALCIUM SERPL-MCNC: 9.8 MG/DL (ref 8.6–10.3)
CANNABINOIDS UR QL SCN: ABNORMAL
CARDIAC TROPONIN I PNL SERPL HS: 48 NG/L (ref 0–13)
CHLORIDE BLDA-SCNC: 92 MMOL/L (ref 98–107)
CHLORIDE BLDA-SCNC: 96 MMOL/L (ref 98–107)
CHLORIDE SERPL-SCNC: 89 MMOL/L (ref 98–107)
CO2 SERPL-SCNC: 39 MMOL/L (ref 21–32)
CREAT SERPL-MCNC: 0.57 MG/DL (ref 0.5–1.05)
CRITICAL CALL TIME: 219
CRITICAL CALLED BY: ABNORMAL
CRITICAL CALLED TO: ABNORMAL
CRITICAL READ BACK: ABNORMAL
EOSINOPHIL # BLD AUTO: 0.28 X10*3/UL (ref 0–0.7)
EOSINOPHIL NFR BLD AUTO: 1.7 %
ERYTHROCYTE [DISTWIDTH] IN BLOOD BY AUTOMATED COUNT: 12.4 % (ref 11.5–14.5)
FENTANYL+NORFENTANYL UR QL SCN: ABNORMAL
GFR SERPL CREATININE-BSD FRML MDRD: >90 ML/MIN/1.73M*2
GLUCOSE BLD MANUAL STRIP-MCNC: 114 MG/DL (ref 74–99)
GLUCOSE BLD MANUAL STRIP-MCNC: 128 MG/DL (ref 74–99)
GLUCOSE BLD MANUAL STRIP-MCNC: 141 MG/DL (ref 74–99)
GLUCOSE BLD MANUAL STRIP-MCNC: 89 MG/DL (ref 74–99)
GLUCOSE BLD MANUAL STRIP-MCNC: 98 MG/DL (ref 74–99)
GLUCOSE BLDA-MCNC: 126 MG/DL (ref 74–99)
GLUCOSE BLDA-MCNC: 99 MG/DL (ref 74–99)
GLUCOSE SERPL-MCNC: 85 MG/DL (ref 74–99)
HCO3 BLDA-SCNC: 37.6 MMOL/L (ref 22–26)
HCO3 BLDA-SCNC: 45.9 MMOL/L (ref 22–26)
HCT VFR BLD AUTO: 52.5 % (ref 36–46)
HCT VFR BLD EST: 47 % (ref 36–46)
HCT VFR BLD EST: 53 % (ref 36–46)
HGB BLD-MCNC: 17.2 G/DL (ref 12–16)
HGB BLDA-MCNC: 15.6 G/DL (ref 12–16)
HGB BLDA-MCNC: 17.5 G/DL (ref 12–16)
IMM GRANULOCYTES # BLD AUTO: 0.06 X10*3/UL (ref 0–0.7)
IMM GRANULOCYTES NFR BLD AUTO: 0.4 % (ref 0–0.9)
INHALED O2 CONCENTRATION: 44 %
INHALED O2 CONCENTRATION: 50 %
LACTATE BLDA-SCNC: 1.4 MMOL/L (ref 0.4–2)
LACTATE BLDA-SCNC: 1.4 MMOL/L (ref 0.4–2)
LACTATE SERPL-SCNC: 1.4 MMOL/L (ref 0.4–2)
LIPASE SERPL-CCNC: <3 U/L (ref 9–82)
LYMPHOCYTES # BLD AUTO: 3.03 X10*3/UL (ref 1.2–4.8)
LYMPHOCYTES NFR BLD AUTO: 18 %
MAGNESIUM SERPL-MCNC: 1.76 MG/DL (ref 1.6–2.4)
MCH RBC QN AUTO: 32.3 PG (ref 26–34)
MCHC RBC AUTO-ENTMCNC: 32.8 G/DL (ref 32–36)
MCV RBC AUTO: 99 FL (ref 80–100)
METHADONE UR QL SCN: ABNORMAL
MONOCYTES # BLD AUTO: 1.52 X10*3/UL (ref 0.1–1)
MONOCYTES NFR BLD AUTO: 9 %
NEUTROPHILS # BLD AUTO: 11.82 X10*3/UL (ref 1.2–7.7)
NEUTROPHILS NFR BLD AUTO: 70 %
NRBC BLD-RTO: 0 /100 WBCS (ref 0–0)
OPIATES UR QL SCN: ABNORMAL
OXYCODONE+OXYMORPHONE UR QL SCN: ABNORMAL
OXYHGB MFR BLDA: 82.6 % (ref 94–98)
OXYHGB MFR BLDA: 91.5 % (ref 94–98)
PCO2 BLDA: 65 MM HG (ref 38–42)
PCO2 BLDA: 89 MM HG (ref 38–42)
PCP UR QL SCN: ABNORMAL
PEEP CMH2O: 5 CM H2O
PH BLDA: 7.32 PH (ref 7.38–7.42)
PH BLDA: 7.37 PH (ref 7.38–7.42)
PLATELET # BLD AUTO: 180 X10*3/UL (ref 150–450)
PMV BLD AUTO: 12.6 FL (ref 7.5–11.5)
PO2 BLDA: 147 MM HG (ref 85–95)
PO2 BLDA: 62 MM HG (ref 85–95)
POTASSIUM BLDA-SCNC: 4.7 MMOL/L (ref 3.5–5.3)
POTASSIUM BLDA-SCNC: 4.9 MMOL/L (ref 3.5–5.3)
POTASSIUM SERPL-SCNC: 4.2 MMOL/L (ref 3.5–5.3)
PROT SERPL-MCNC: 7.7 G/DL (ref 6.4–8.2)
RBC # BLD AUTO: 5.32 X10*6/UL (ref 4–5.2)
SAO2 % BLDA: 100 % (ref 94–100)
SAO2 % BLDA: 93 % (ref 94–100)
SODIUM BLDA-SCNC: 129 MMOL/L (ref 136–145)
SODIUM BLDA-SCNC: 131 MMOL/L (ref 136–145)
SODIUM SERPL-SCNC: 136 MMOL/L (ref 136–145)
TIDAL VOLUME: 450 ML
VENTILATOR MODE: ABNORMAL
VENTILATOR RATE: 24 BPM
WBC # BLD AUTO: 16.9 X10*3/UL (ref 4.4–11.3)

## 2023-10-21 PROCEDURE — 99291 CRITICAL CARE FIRST HOUR: CPT | Performed by: NURSE PRACTITIONER

## 2023-10-21 PROCEDURE — 71275 CT ANGIOGRAPHY CHEST: CPT

## 2023-10-21 PROCEDURE — 2500000001 HC RX 250 WO HCPCS SELF ADMINISTERED DRUGS (ALT 637 FOR MEDICARE OP): Performed by: NURSE PRACTITIONER

## 2023-10-21 PROCEDURE — 96361 HYDRATE IV INFUSION ADD-ON: CPT | Mod: 59

## 2023-10-21 PROCEDURE — 2500000004 HC RX 250 GENERAL PHARMACY W/ HCPCS (ALT 636 FOR OP/ED)

## 2023-10-21 PROCEDURE — 83735 ASSAY OF MAGNESIUM: CPT | Performed by: EMERGENCY MEDICINE

## 2023-10-21 PROCEDURE — 96372 THER/PROPH/DIAG INJ SC/IM: CPT | Performed by: NURSE PRACTITIONER

## 2023-10-21 PROCEDURE — 2500000004 HC RX 250 GENERAL PHARMACY W/ HCPCS (ALT 636 FOR OP/ED): Performed by: EMERGENCY MEDICINE

## 2023-10-21 PROCEDURE — 94002 VENT MGMT INPAT INIT DAY: CPT | Mod: CCI

## 2023-10-21 PROCEDURE — 36600 WITHDRAWAL OF ARTERIAL BLOOD: CPT

## 2023-10-21 PROCEDURE — 83605 ASSAY OF LACTIC ACID: CPT | Performed by: EMERGENCY MEDICINE

## 2023-10-21 PROCEDURE — 71275 CT ANGIOGRAPHY CHEST: CPT | Performed by: RADIOLOGY

## 2023-10-21 PROCEDURE — 71045 X-RAY EXAM CHEST 1 VIEW: CPT | Performed by: RADIOLOGY

## 2023-10-21 PROCEDURE — 84132 ASSAY OF SERUM POTASSIUM: CPT | Performed by: EMERGENCY MEDICINE

## 2023-10-21 PROCEDURE — 93005 ELECTROCARDIOGRAM TRACING: CPT

## 2023-10-21 PROCEDURE — 31500 INSERT EMERGENCY AIRWAY: CPT | Performed by: EMERGENCY MEDICINE

## 2023-10-21 PROCEDURE — 87075 CULTR BACTERIA EXCEPT BLOOD: CPT | Mod: CMCLAB,ELYLAB | Performed by: EMERGENCY MEDICINE

## 2023-10-21 PROCEDURE — 36415 COLL VENOUS BLD VENIPUNCTURE: CPT | Performed by: EMERGENCY MEDICINE

## 2023-10-21 PROCEDURE — 2550000001 HC RX 255 CONTRASTS: Performed by: EMERGENCY MEDICINE

## 2023-10-21 PROCEDURE — 96375 TX/PRO/DX INJ NEW DRUG ADDON: CPT | Mod: 59

## 2023-10-21 PROCEDURE — 85025 COMPLETE CBC W/AUTO DIFF WBC: CPT | Performed by: EMERGENCY MEDICINE

## 2023-10-21 PROCEDURE — 80358 DRUG SCREENING METHADONE: CPT | Mod: ELYLAB | Performed by: SURGERY

## 2023-10-21 PROCEDURE — 70450 CT HEAD/BRAIN W/O DYE: CPT

## 2023-10-21 PROCEDURE — 2500000004 HC RX 250 GENERAL PHARMACY W/ HCPCS (ALT 636 FOR OP/ED): Performed by: NURSE PRACTITIONER

## 2023-10-21 PROCEDURE — 2500000005 HC RX 250 GENERAL PHARMACY W/O HCPCS: Performed by: NURSE PRACTITIONER

## 2023-10-21 PROCEDURE — 70450 CT HEAD/BRAIN W/O DYE: CPT | Performed by: RADIOLOGY

## 2023-10-21 PROCEDURE — 0BH17EZ INSERTION OF ENDOTRACHEAL AIRWAY INTO TRACHEA, VIA NATURAL OR ARTIFICIAL OPENING: ICD-10-PCS | Performed by: NURSE PRACTITIONER

## 2023-10-21 PROCEDURE — 94660 CPAP INITIATION&MGMT: CPT

## 2023-10-21 PROCEDURE — 2500000005 HC RX 250 GENERAL PHARMACY W/O HCPCS

## 2023-10-21 PROCEDURE — 84132 ASSAY OF SERUM POTASSIUM: CPT | Performed by: NURSE PRACTITIONER

## 2023-10-21 PROCEDURE — 94799 UNLISTED PULMONARY SVC/PX: CPT

## 2023-10-21 PROCEDURE — 71045 X-RAY EXAM CHEST 1 VIEW: CPT | Mod: FY

## 2023-10-21 PROCEDURE — 87040 BLOOD CULTURE FOR BACTERIA: CPT | Mod: CMCLAB,ELYLAB | Performed by: EMERGENCY MEDICINE

## 2023-10-21 PROCEDURE — 71045 X-RAY EXAM CHEST 1 VIEW: CPT

## 2023-10-21 PROCEDURE — 80307 DRUG TEST PRSMV CHEM ANLYZR: CPT | Performed by: EMERGENCY MEDICINE

## 2023-10-21 PROCEDURE — 5A1935Z RESPIRATORY VENTILATION, LESS THAN 24 CONSECUTIVE HOURS: ICD-10-PCS | Performed by: NURSE PRACTITIONER

## 2023-10-21 PROCEDURE — 82947 ASSAY GLUCOSE BLOOD QUANT: CPT | Performed by: EMERGENCY MEDICINE

## 2023-10-21 PROCEDURE — 99223 1ST HOSP IP/OBS HIGH 75: CPT | Performed by: PSYCHIATRY & NEUROLOGY

## 2023-10-21 PROCEDURE — 82947 ASSAY GLUCOSE BLOOD QUANT: CPT

## 2023-10-21 PROCEDURE — 82140 ASSAY OF AMMONIA: CPT | Performed by: EMERGENCY MEDICINE

## 2023-10-21 PROCEDURE — 96365 THER/PROPH/DIAG IV INF INIT: CPT | Mod: 59

## 2023-10-21 PROCEDURE — 83690 ASSAY OF LIPASE: CPT | Performed by: EMERGENCY MEDICINE

## 2023-10-21 PROCEDURE — 2020000001 HC ICU ROOM DAILY

## 2023-10-21 PROCEDURE — 80307 DRUG TEST PRSMV CHEM ANLYZR: CPT | Performed by: SURGERY

## 2023-10-21 PROCEDURE — 84484 ASSAY OF TROPONIN QUANT: CPT | Performed by: EMERGENCY MEDICINE

## 2023-10-21 PROCEDURE — 99285 EMERGENCY DEPT VISIT HI MDM: CPT | Performed by: EMERGENCY MEDICINE

## 2023-10-21 RX ORDER — CHLORHEXIDINE GLUCONATE ORAL RINSE 1.2 MG/ML
15 SOLUTION DENTAL 3 TIMES DAILY
Status: DISCONTINUED | OUTPATIENT
Start: 2023-10-21 | End: 2023-10-21 | Stop reason: DRUGHIGH

## 2023-10-21 RX ORDER — FAMOTIDINE 20 MG/1
20 TABLET, FILM COATED ORAL DAILY
Status: DISCONTINUED | OUTPATIENT
Start: 2023-10-21 | End: 2023-10-21 | Stop reason: SDUPTHER

## 2023-10-21 RX ORDER — CHLORHEXIDINE GLUCONATE ORAL RINSE 1.2 MG/ML
15 SOLUTION DENTAL
Status: DISCONTINUED | OUTPATIENT
Start: 2023-10-21 | End: 2023-10-22 | Stop reason: HOSPADM

## 2023-10-21 RX ORDER — MANNITOL 250 MG/ML
25 INJECTION, SOLUTION INTRAVENOUS ONCE
Status: DISCONTINUED | OUTPATIENT
Start: 2023-10-21 | End: 2023-10-21

## 2023-10-21 RX ORDER — MAGNESIUM SULFATE HEPTAHYDRATE 40 MG/ML
INJECTION, SOLUTION INTRAVENOUS
Status: DISCONTINUED
Start: 2023-10-21 | End: 2023-10-21

## 2023-10-21 RX ORDER — LORAZEPAM 2 MG/ML
1 INJECTION INTRAMUSCULAR ONCE
Status: COMPLETED | OUTPATIENT
Start: 2023-10-21 | End: 2023-10-21

## 2023-10-21 RX ORDER — ENOXAPARIN SODIUM 100 MG/ML
40 INJECTION SUBCUTANEOUS EVERY 24 HOURS
Status: DISCONTINUED | OUTPATIENT
Start: 2023-10-21 | End: 2023-10-22 | Stop reason: HOSPADM

## 2023-10-21 RX ORDER — FAMOTIDINE 20 MG/1
20 TABLET, FILM COATED ORAL 2 TIMES DAILY
Status: DISCONTINUED | OUTPATIENT
Start: 2023-10-21 | End: 2023-10-22 | Stop reason: HOSPADM

## 2023-10-21 RX ORDER — FAMOTIDINE 10 MG/ML
20 INJECTION INTRAVENOUS DAILY
Status: DISCONTINUED | OUTPATIENT
Start: 2023-10-21 | End: 2023-10-21 | Stop reason: SDUPTHER

## 2023-10-21 RX ORDER — INSULIN LISPRO 100 [IU]/ML
0-5 INJECTION, SOLUTION INTRAVENOUS; SUBCUTANEOUS EVERY 6 HOURS
Status: DISCONTINUED | OUTPATIENT
Start: 2023-10-21 | End: 2023-10-22 | Stop reason: HOSPADM

## 2023-10-21 RX ORDER — NOREPINEPHRINE BITARTRATE/D5W 8 MG/250ML
.01-1 PLASTIC BAG, INJECTION (ML) INTRAVENOUS CONTINUOUS
Status: DISCONTINUED | OUTPATIENT
Start: 2023-10-21 | End: 2023-10-21

## 2023-10-21 RX ORDER — PROPOFOL 10 MG/ML
INJECTION, EMULSION INTRAVENOUS
Status: COMPLETED
Start: 2023-10-21 | End: 2023-10-21

## 2023-10-21 RX ORDER — SODIUM CHLORIDE 9 MG/ML
50 INJECTION, SOLUTION INTRAVENOUS CONTINUOUS
Status: DISCONTINUED | OUTPATIENT
Start: 2023-10-21 | End: 2023-10-22 | Stop reason: HOSPADM

## 2023-10-21 RX ORDER — MAGNESIUM SULFATE HEPTAHYDRATE 40 MG/ML
2 INJECTION, SOLUTION INTRAVENOUS ONCE
Status: DISCONTINUED | OUTPATIENT
Start: 2023-10-21 | End: 2023-10-21

## 2023-10-21 RX ORDER — PROPOFOL 10 MG/ML
5-20 INJECTION, EMULSION INTRAVENOUS CONTINUOUS
Status: DISCONTINUED | OUTPATIENT
Start: 2023-10-21 | End: 2023-10-21

## 2023-10-21 RX ORDER — DEXMEDETOMIDINE HYDROCHLORIDE 4 UG/ML
INJECTION, SOLUTION INTRAVENOUS
Status: COMPLETED
Start: 2023-10-21 | End: 2023-10-21

## 2023-10-21 RX ORDER — MANNITOL 20 G/100ML
25 INJECTION, SOLUTION INTRAVENOUS ONCE
Status: COMPLETED | OUTPATIENT
Start: 2023-10-21 | End: 2023-10-21

## 2023-10-21 RX ORDER — DEXMEDETOMIDINE HYDROCHLORIDE 4 UG/ML
.1-1.5 INJECTION, SOLUTION INTRAVENOUS CONTINUOUS
Status: DISCONTINUED | OUTPATIENT
Start: 2023-10-21 | End: 2023-10-21

## 2023-10-21 RX ORDER — DEXTROSE 50 % IN WATER (D50W) INTRAVENOUS SYRINGE
25
Status: DISCONTINUED | OUTPATIENT
Start: 2023-10-21 | End: 2023-10-22 | Stop reason: HOSPADM

## 2023-10-21 RX ORDER — FAMOTIDINE 10 MG/ML
20 INJECTION INTRAVENOUS 2 TIMES DAILY
Status: DISCONTINUED | OUTPATIENT
Start: 2023-10-21 | End: 2023-10-22 | Stop reason: HOSPADM

## 2023-10-21 RX ORDER — DEXTROSE MONOHYDRATE 100 MG/ML
0.3 INJECTION, SOLUTION INTRAVENOUS ONCE AS NEEDED
Status: DISCONTINUED | OUTPATIENT
Start: 2023-10-21 | End: 2023-10-22 | Stop reason: HOSPADM

## 2023-10-21 RX ADMIN — DEXMEDETOMIDINE HYDROCHLORIDE 400 MCG: 400 INJECTION INTRAVENOUS at 03:47

## 2023-10-21 RX ADMIN — FAMOTIDINE 20 MG: 10 INJECTION INTRAVENOUS at 11:44

## 2023-10-21 RX ADMIN — SODIUM CHLORIDE 1000 ML: 9 INJECTION, SOLUTION INTRAVENOUS at 11:39

## 2023-10-21 RX ADMIN — NOREPINEPHRINE BITARTRATE 0.01 MCG/KG/MIN: 8 INJECTION, SOLUTION INTRAVENOUS at 06:31

## 2023-10-21 RX ADMIN — CHLORHEXIDINE GLUCONATE 15 ML: 1.2 RINSE ORAL at 14:58

## 2023-10-21 RX ADMIN — CEFTRIAXONE 2 G: 2 INJECTION, POWDER, FOR SOLUTION INTRAMUSCULAR; INTRAVENOUS at 03:25

## 2023-10-21 RX ADMIN — DEXMEDETOMIDINE HYDROCHLORIDE 0.6 MCG/KG/HR: 400 INJECTION INTRAVENOUS at 06:31

## 2023-10-21 RX ADMIN — Medication: at 03:20

## 2023-10-21 RX ADMIN — SODIUM CHLORIDE 1000 ML: 9 INJECTION, SOLUTION INTRAVENOUS at 02:20

## 2023-10-21 RX ADMIN — PROPOFOL 5 MCG/KG/MIN: 10 INJECTION, EMULSION INTRAVENOUS at 03:49

## 2023-10-21 RX ADMIN — LORAZEPAM 1 MG: 2 INJECTION INTRAMUSCULAR; INTRAVENOUS at 02:55

## 2023-10-21 RX ADMIN — ENOXAPARIN SODIUM 40 MG: 40 INJECTION SUBCUTANEOUS at 08:35

## 2023-10-21 RX ADMIN — SODIUM CHLORIDE 50 ML/HR: 9 INJECTION, SOLUTION INTRAVENOUS at 16:12

## 2023-10-21 RX ADMIN — IOHEXOL 75 ML: 350 INJECTION, SOLUTION INTRAVENOUS at 05:01

## 2023-10-21 RX ADMIN — MANNITOL 25 G: 20 INJECTION, SOLUTION INTRAVENOUS at 07:00

## 2023-10-21 RX ADMIN — CHLORHEXIDINE GLUCONATE 15 ML: 1.2 RINSE ORAL at 08:35

## 2023-10-21 ASSESSMENT — ENCOUNTER SYMPTOMS
BACK PAIN: 0
VOMITING: 0
PALPITATIONS: 0
EYE PAIN: 0
SLEEP DISTURBANCE: 0
TROUBLE SWALLOWING: 0
PHOTOPHOBIA: 0
JOINT SWELLING: 0
SINUS PRESSURE: 0
SEIZURES: 0
CONFUSION: 0
TREMORS: 0
FACIAL ASYMMETRY: 0
BRUISES/BLEEDS EASILY: 0
FEVER: 0
NECK STIFFNESS: 0
FREQUENCY: 0
NECK PAIN: 0
NAUSEA: 0
ARTHRALGIAS: 0
SPEECH DIFFICULTY: 0
FATIGUE: 0
AGITATION: 0
ABDOMINAL PAIN: 0
WEAKNESS: 0
WHEEZING: 0
DIFFICULTY URINATING: 0
HEADACHES: 0
ADENOPATHY: 0
COUGH: 0
UNEXPECTED WEIGHT CHANGE: 0
HALLUCINATIONS: 0
NUMBNESS: 0
SHORTNESS OF BREATH: 0
LIGHT-HEADEDNESS: 0
DIZZINESS: 0
HYPERACTIVE: 0

## 2023-10-21 ASSESSMENT — LIFESTYLE VARIABLES
EVER HAD A DRINK FIRST THING IN THE MORNING TO STEADY YOUR NERVES TO GET RID OF A HANGOVER: NO
HAVE YOU EVER FELT YOU SHOULD CUT DOWN ON YOUR DRINKING: NO
EVER FELT BAD OR GUILTY ABOUT YOUR DRINKING: NO
HAVE PEOPLE ANNOYED YOU BY CRITICIZING YOUR DRINKING: NO
REASON UNABLE TO ASSESS: NO

## 2023-10-21 ASSESSMENT — COLUMBIA-SUICIDE SEVERITY RATING SCALE - C-SSRS
6. HAVE YOU EVER DONE ANYTHING, STARTED TO DO ANYTHING, OR PREPARED TO DO ANYTHING TO END YOUR LIFE?: NO
1. IN THE PAST MONTH, HAVE YOU WISHED YOU WERE DEAD OR WISHED YOU COULD GO TO SLEEP AND NOT WAKE UP?: NO
2. HAVE YOU ACTUALLY HAD ANY THOUGHTS OF KILLING YOURSELF?: NO

## 2023-10-21 ASSESSMENT — PAIN - FUNCTIONAL ASSESSMENT
PAIN_FUNCTIONAL_ASSESSMENT: UNABLE TO SELF-REPORT
PAIN_FUNCTIONAL_ASSESSMENT: CPOT (CRITICAL CARE PAIN OBSERVATION TOOL)

## 2023-10-21 ASSESSMENT — PAIN SCALES - GENERAL
PAINLEVEL_OUTOF10: 0 - NO PAIN

## 2023-10-21 NOTE — CONSULTS
History Of Present Illness  Radha Locke is a 53 y.o. female presenting with change in mental status and confusion.  Patient was found unresponsive in the bathroom.  When patient was found by family on arrival of the ED as the patient was given Narcan with no improvement on arrival to the ED patient was alert but more lethargic.  Patient was intubated for airway protection and a CAT scan was done which showed mild cerebral edema.  When patient was extubated he has been moving all 4 extremities.  No history of any seizure or seizure-like activity.  No history of any headache nausea or vomiting    Please refer to the initial H&P and the ER records for details.    Past Medical History  Past Medical History:   Diagnosis Date    Carpal tunnel syndrome, right upper limb     Carpal tunnel syndrome of right wrist    Cervical disc disorder with myelopathy, unspecified cervical region     Degeneration of cervical intervertebral disc with myelopathy    Encounter for screening for malignant neoplasm of cervix 11/21/2018    Screening for cervical cancer    Encounter for screening, unspecified 11/21/2018    Screening for condition    Personal history of other diseases of the circulatory system     History of cardiac disorder    Personal history of other diseases of the musculoskeletal system and connective tissue     History of fibromyalgia    Personal history of other diseases of the musculoskeletal system and connective tissue     History of chronic back pain    Personal history of other diseases of the nervous system and sense organs     History of restless legs syndrome    Personal history of other diseases of the respiratory system     History of chronic obstructive lung disease    Personal history of other mental and behavioral disorders     History of depression    Personal history of other specified conditions     History of headache    Personal history of pneumonia (recurrent) 10/10/2019    History of community  acquired pneumonia    Radiculopathy, cervical region     Cervical radiculopathy at C6       Surgical History  Past Surgical History:   Procedure Laterality Date    OTHER SURGICAL HISTORY  11/07/2018    Hysterectomy       Social History  Social History     Tobacco Use    Smoking status: Every Day     Packs/day: 1.00     Years: 15.00     Additional pack years: 0.00     Total pack years: 15.00     Types: Cigarettes    Smokeless tobacco: Never   Substance Use Topics    Alcohol use: Not Currently       Allergies  Hydrocodone, Hydrocodone-acetaminophen, Penicillins, and Tramadol    Medications Prior to Admission   Medication Sig Dispense Refill Last Dose    albuterol 90 mcg/actuation inhaler Inhale 4 times a day as needed for shortness of breath.       azithromycin (Zithromax) 250 mg tablet take 1 tablet (250 mg) by mouth every Monday, Wednesday, and Friday for COPD exacerbation prevention       clindamycin (Cleocin) 300 mg capsule Take 1 capsule (300 mg) by mouth in the morning and 1 capsule (300 mg) at noon and 1 capsule (300 mg) in the evening and 1 capsule (300 mg) before bedtime.       ipratropium-albuteroL (Duo-Neb) 0.5-2.5 mg/3 mL nebulizer solution Inhale 4 times a day. USE 1 VIAL VIA NEBULIZER       lidocaine (Lidoderm) 5 % patch 1 patch. APPLY TO THE AFFECTED AREA DAILY, LEAVE IN PLACE FOR 12 HOURS, THEN REMOVE AND LEAVE OFFF FOR 12 HOURS       methadone (Dolophine) 10 mg tablet Take 1 tablet (10 mg) by mouth once daily.       metoprolol succinate XL (Toprol-XL) 50 mg 24 hr tablet Take 1 tablet (50 mg) by mouth once daily.       nicotine polacrilex (Nicorette) 4 mg lozenge Dissolve 1 lozenge (4 mg) in the mouth every 2 hours if needed for smoking cessation. 72 lozenge 2     predniSONE (Deltasone) 5 mg tablet Take 1 tablet (5 mg) by mouth once daily.       pregabalin (Lyrica) 150 mg capsule Take 1 capsule (150 mg) by mouth in the morning and 1 capsule (150 mg) in the evening and 1 capsule (150 mg) before bedtime.        Stiolto Respimat 2.5-2.5 mcg/actuation mist inhaler inhale 2 puffs by inhalation route once daily at the same time each day       tiotropium (Spiriva Respimat) 2.5 mcg/actuation inhaler Inhale 2 puffs once daily.          Review of Systems   Constitutional:  Negative for fatigue, fever and unexpected weight change.   HENT:  Negative for dental problem, ear pain, hearing loss, sinus pressure, tinnitus and trouble swallowing.    Eyes:  Negative for photophobia, pain and visual disturbance.   Respiratory:  Negative for cough, shortness of breath and wheezing.    Cardiovascular:  Negative for chest pain, palpitations and leg swelling.   Gastrointestinal:  Negative for abdominal pain, nausea and vomiting.   Genitourinary:  Negative for difficulty urinating, enuresis and frequency.   Musculoskeletal:  Negative for arthralgias, back pain, joint swelling, neck pain and neck stiffness.   Skin:  Negative for pallor and rash.   Allergic/Immunologic: Negative for food allergies.   Neurological:  Negative for dizziness, tremors, seizures, syncope, facial asymmetry, speech difficulty, weakness, light-headedness, numbness and headaches.   Hematological:  Negative for adenopathy. Does not bruise/bleed easily.   Psychiatric/Behavioral:  Negative for agitation, behavioral problems, confusion, hallucinations and sleep disturbance. The patient is not hyperactive.        Physical Exam/Neurological Exam  Constitutional: General appearance: no acute distress   Auscultation of Heart: Regular rate and rhythm, no murmurs, normal S1 and S2.   Carotid Arteries: Intact without any bruits.   Neck is supple.   No lymph adenopathy.   Peripheral Vascular Exam: Pulses +2 and equal in all extremities. No swelling, varicosities, edema or tenderness to palpations.    Abdomen is soft, nondistended. No organomegaly.  Mental status: The patient was in no distress, alert, interactive and cooperative. Affect is appropriate.   Orientation: oriented to  person, oriented to place and oriented to time.   Memory: recent memory intact and remote memory intact.   Attention: normal attention span and normal concentrating ability.   Language: normal comprehension and no difficulty naming common objects.   Fund of knowledge: Patient displays adequate knowledge of current events, adequate fund of knowledge regarding past history and adequate fund of knowledge regarding vocabulary.   Eyes: The ophthalmoscopic examination was normal. The fundi are visualized with normal disc margins and without.  Cranial nerve II: Visual fields full to confrontation.   Cranial nerves III, IV, and VI: Pupils round, equally reactive to light; no ptosis. EOMs intact. No nystagmus.   Cranial Nerve V: Facial sensation intact bilaterally.   Cranial nerve VII: Normal and symmetric facial strength.   Cranial nerve VIII: Hearing is intact bilaterally to finger rub / whisper.   Cranial nerves IX and X: Palate elevates symmetrically.   Cranial nerve XI: Shoulder shrug and neck rotation strength are intact.   Cranial nerve XII: Tongue midline with normal strength.   Motor: Motor exam was normal. Muscle bulk was normal in both upper and lower extremities. Muscle tone was normal in both upper and lower extremities. Muscle strength was 5/5 throughout. no abnormal or adventitious movements were present.   Deep Tendon Reflexes: left biceps 2+ , right biceps 2+, left triceps 2+, right triceps 2+, left brachioradialis 2+, right brachioradialis 2+, left patella 2+, right patella 2+, left ankle jerk 2+, right ankle jerk 2+   Plantar Reflex: Toes downgoing to plantar stimulation on the left. Toes downgoing to plantar stimulation on the right.   Sensory Exam: Normal to light touch.   Coordination: There is no limb dystaxia and rapid alternating movements are intact.  Gait: Gait is deferred due to patient acute condition.  Last Recorded Vitals  Blood pressure 108/74, pulse 106, temperature 36.5 °C (97.7 °F),  "temperature source Tympanic, resp. rate (!) 31, height 1.651 m (5' 5\"), weight 71.4 kg (157 lb 6.5 oz), SpO2 93 %.    Relevant Results  Recent Results (from the past 24 hour(s))   CBC and Auto Differential    Collection Time: 10/21/23  2:13 AM   Result Value Ref Range    WBC 16.9 (H) 4.4 - 11.3 x10*3/uL    nRBC 0.0 0.0 - 0.0 /100 WBCs    RBC 5.32 (H) 4.00 - 5.20 x10*6/uL    Hemoglobin 17.2 (H) 12.0 - 16.0 g/dL    Hematocrit 52.5 (H) 36.0 - 46.0 %    MCV 99 80 - 100 fL    MCH 32.3 26.0 - 34.0 pg    MCHC 32.8 32.0 - 36.0 g/dL    RDW 12.4 11.5 - 14.5 %    Platelets 180 150 - 450 x10*3/uL    MPV 12.6 (H) 7.5 - 11.5 fL    Neutrophils % 70.0 40.0 - 80.0 %    Immature Granulocytes %, Automated 0.4 0.0 - 0.9 %    Lymphocytes % 18.0 13.0 - 44.0 %    Monocytes % 9.0 2.0 - 10.0 %    Eosinophils % 1.7 0.0 - 6.0 %    Basophils % 0.9 0.0 - 2.0 %    Neutrophils Absolute 11.82 (H) 1.20 - 7.70 x10*3/uL    Immature Granulocytes Absolute, Automated 0.06 0.00 - 0.70 x10*3/uL    Lymphocytes Absolute 3.03 1.20 - 4.80 x10*3/uL    Monocytes Absolute 1.52 (H) 0.10 - 1.00 x10*3/uL    Eosinophils Absolute 0.28 0.00 - 0.70 x10*3/uL    Basophils Absolute 0.15 (H) 0.00 - 0.10 x10*3/uL   Magnesium    Collection Time: 10/21/23  2:13 AM   Result Value Ref Range    Magnesium 1.76 1.60 - 2.40 mg/dL   Comprehensive metabolic panel    Collection Time: 10/21/23  2:13 AM   Result Value Ref Range    Glucose 85 74 - 99 mg/dL    Sodium 136 136 - 145 mmol/L    Potassium 4.2 3.5 - 5.3 mmol/L    Chloride 89 (L) 98 - 107 mmol/L    Bicarbonate 39 (H) 21 - 32 mmol/L    Anion Gap 12 10 - 20 mmol/L    Urea Nitrogen 17 6 - 23 mg/dL    Creatinine 0.57 0.50 - 1.05 mg/dL    eGFR >90 >60 mL/min/1.73m*2    Calcium 9.8 8.6 - 10.3 mg/dL    Albumin 4.4 3.4 - 5.0 g/dL    Alkaline Phosphatase 72 33 - 110 U/L    Total Protein 7.7 6.4 - 8.2 g/dL    AST 21 9 - 39 U/L    Bilirubin, Total 0.5 0.0 - 1.2 mg/dL    ALT 27 7 - 45 U/L   Lipase    Collection Time: 10/21/23  2:13 AM "   Result Value Ref Range    Lipase <3 (L) 9 - 82 U/L   Lactate    Collection Time: 10/21/23  2:13 AM   Result Value Ref Range    Lactate 1.4 0.4 - 2.0 mmol/L   Troponin I, High Sensitivity    Collection Time: 10/21/23  2:13 AM   Result Value Ref Range    Troponin I, High Sensitivity 48 (H) 0 - 13 ng/L   Ammonia    Collection Time: 10/21/23  2:13 AM   Result Value Ref Range    Ammonia 30 16 - 53 umol/L   BLOOD GAS ARTERIAL FULL PANEL    Collection Time: 10/21/23  2:16 AM   Result Value Ref Range    POCT pH, Arterial 7.32 (L) 7.38 - 7.42 pH    POCT pCO2, Arterial 89 (HH) 38 - 42 mm Hg    POCT pO2, Arterial 62 (L) 85 - 95 mm Hg    POCT SO2, Arterial 93 (L) 94 - 100 %    POCT Oxy Hemoglobin, Arterial 82.6 (L) 94.0 - 98.0 %    POCT Hematocrit Calculated, Arterial 53.0 (H) 36.0 - 46.0 %    POCT Sodium, Arterial 131 (L) 136 - 145 mmol/L    POCT Potassium, Arterial 4.7 3.5 - 5.3 mmol/L    POCT Chloride, Arterial 92 (L) 98 - 107 mmol/L    POCT Ionized Calcium, Arterial 1.20 1.10 - 1.33 mmol/L    POCT Glucose, Arterial 99 74 - 99 mg/dL    POCT Lactate, Arterial 1.4 0.4 - 2.0 mmol/L    POCT Base Excess, Arterial 14.0 (H) -2.0 - 3.0 mmol/L    POCT HCO3 Calculated, Arterial 45.9 (H) 22.0 - 26.0 mmol/L    POCT Hemoglobin, Arterial 17.5 (H) 12.0 - 16.0 g/dL    POCT Anion Gap, Arterial -2 (L) 10 - 25 mmo/L    Patient Temperature      FiO2 44 %    Apparatus CANNULA     Critical Called By SABINE RRT     Critical Called To RAGHU EGAN     Critical Call Time [phone]     Critical Read Back Y    POCT GLUCOSE    Collection Time: 10/21/23  3:04 AM   Result Value Ref Range    POCT Glucose 114 (H) 74 - 99 mg/dL   Drug Screen, Urine    Collection Time: 10/21/23  3:14 AM   Result Value Ref Range    Amphetamine Screen, Urine Presumptive Negative Presumptive Negative    Barbiturate Screen, Urine Presumptive Negative Presumptive Negative    Benzodiazepines Screen, Urine Presumptive Negative Presumptive Negative    Cannabinoid Screen, Urine  Presumptive Negative Presumptive Negative    Cocaine Metabolite Screen, Urine Presumptive Negative Presumptive Negative    Fentanyl Screen, Urine Presumptive Positive (A) Presumptive Negative    Opiate Screen, Urine Presumptive Positive (A) Presumptive Negative    Oxycodone Screen, Urine Presumptive Negative Presumptive Negative    PCP Screen, Urine Presumptive Negative Presumptive Negative   Blood Culture    Collection Time: 10/21/23  3:26 AM    Specimen: Peripheral Venipuncture; Blood culture   Result Value Ref Range    Blood Culture Loaded on Instrument - Culture in progress    Blood Culture    Collection Time: 10/21/23  3:26 AM    Specimen: Peripheral Venipuncture; Blood culture   Result Value Ref Range    Blood Culture Loaded on Instrument - Culture in progress    POCT GLUCOSE    Collection Time: 10/21/23  6:18 AM   Result Value Ref Range    POCT Glucose 128 (H) 74 - 99 mg/dL   Blood Gas Arterial Full Panel    Collection Time: 10/21/23  6:27 AM   Result Value Ref Range    POCT pH, Arterial 7.37 (L) 7.38 - 7.42 pH    POCT pCO2, Arterial 65 (H) 38 - 42 mm Hg    POCT pO2, Arterial 147 (H) 85 - 95 mm Hg    POCT SO2, Arterial 100 94 - 100 %    POCT Oxy Hemoglobin, Arterial 91.5 (L) 94.0 - 98.0 %    POCT Hematocrit Calculated, Arterial 47.0 (H) 36.0 - 46.0 %    POCT Sodium, Arterial 129 (L) 136 - 145 mmol/L    POCT Potassium, Arterial 4.9 3.5 - 5.3 mmol/L    POCT Chloride, Arterial 96 (L) 98 - 107 mmol/L    POCT Ionized Calcium, Arterial 1.08 (L) 1.10 - 1.33 mmol/L    POCT Glucose, Arterial 126 (H) 74 - 99 mg/dL    POCT Lactate, Arterial 1.4 0.4 - 2.0 mmol/L    POCT Base Excess, Arterial 9.3 (H) -2.0 - 3.0 mmol/L    POCT HCO3 Calculated, Arterial 37.6 (H) 22.0 - 26.0 mmol/L    POCT Hemoglobin, Arterial 15.6 12.0 - 16.0 g/dL    POCT Anion Gap, Arterial 0 (L) 10 - 25 mmo/L    Patient Temperature      FiO2 50 %    Apparatus      Ventilator Mode A/C     Ventilator Rate 24 bpm    Tidal Volume 450 mL    Peep CHM2O 5.0 cm  H2O   POCT GLUCOSE    Collection Time: 10/21/23 10:59 AM   Result Value Ref Range    POCT Glucose 141 (H) 74 - 99 mg/dL   POCT GLUCOSE    Collection Time: 10/21/23  4:21 PM   Result Value Ref Range    POCT Glucose 98 74 - 99 mg/dL                       Mears Coma Scale  Best Eye Response: Spontaneous  Best Verbal Response: Inappropriate words  Best Motor Response: Follows commands  Mears Coma Scale Score: 13                 CT angio chest for pulmonary embolism    Result Date: 10/21/2023  Interpreted By:  Alida Abbott, STUDY: CT ANGIO CHEST FOR PULMONARY EMBOLISM;  10/21/2023 5:00 am   INDICATION: Signs/Symptoms:Respiratory distress.   COMPARISON: 12/13/2022   ACCESSION NUMBER(S): [id number]   ORDERING CLINICIAN: ANNE KRISHNAMURTHY   TECHNIQUE: Axial CT images of the chest obtained  after intravenous administration of contrast. Maximum intensity projection images were created and reviewed.   FINDINGS: VESSELS: No aortic aneurysm. No pulmonary embolism. HEART: Normal size.  No pericardial effusion. MEDIASTINUM AND PEDRO: No pathologically enlarged lymph nodes. LUNG, PLEURA, AND LARGE AIRWAYS: No pleural effusion or pneumothorax. No pulmonary consolidation or suspicious pulmonary nodule. Severe emphysema. Right apical calcified granuloma. The endotracheal tube terminates between the clavicular heads and aortic arch. CHEST WALL AND LOWER NECK: Within normal limits.   UPPER ABDOMEN: No acute abnormality of the visualized abdomen. Splenic calcified granulomas noted. Nasogastric tube extends into the gastric body. Low attenuation of the liver suggestive of hepatic steatosis.   BONES: No acute osseous abnormality.       No pulmonary embolism or acute cardiopulmonary process.   Emphysema.   MACRO: None   Signed by: Alida Abbott 10/21/2023 5:41 AM Dictation workstation:   [id number]    CT head wo IV contrast    Result Date: 10/21/2023  Interpreted By:  Alida Abbott, STUDY: CT HEAD WO IV CONTRAST;  10/21/2023 4:59 am    INDICATION: Signs/Symptoms:Altered mental status.   COMPARISON: None.   ACCESSION NUMBER(S): [id number]   ORDERING CLINICIAN: ANNE KRISHNAMURTHY   TECHNIQUE: Axial noncontrast CT images of the head.   FINDINGS: BRAIN PARENCHYMA: There is generalized effacement of cerebral sulci, suggesting cerebral edema. Gray-white matter interfaces are preserved. No mass effect or midline shift.   HEMORRHAGE: No acute intracranial hemorrhage. VENTRICLES and EXTRA-AXIAL SPACES: The ventricles and sulci are within normal limits in size for brain volume. No abnormal extraaxial fluid collection. EXTRACRANIAL SOFT TISSUES: Within normal limits. PARANASAL SINUSES/MASTOIDS: Scattered opacification of the ethmoid air cells. The visualized paranasal sinuses and mastoid air cells are aerated. CALVARIUM: No depressed skull fracture. No destructive osseous lesion.   OTHER FINDINGS: None.       Generalized sulcal effacement suggestive of diffuse cerebral edema. Gray-white differentiation is grossly preserved, however, early or mild hypoxic ischemic encephalopathy is not excluded. Consider short-term follow-up CT.   No acute intracranial hemorrhage or midline shift.     MACRO: None   Signed by: Alida Abbott 10/21/2023 5:35 AM Dictation workstation:   [id number]    XR chest 1 view    Result Date: 10/21/2023  Interpreted By:  Amanda Braun, STUDY: XR CHEST 1 VIEW;  10/21/2023 3:24 am   INDICATION: Signs/Symptoms:Postintubation.   COMPARISON: 12/13/2022, 10/21/2023   ACCESSION NUMBER(S): [id number]   ORDERING CLINICIAN: ANNE KRISHNAMURTHY   FINDINGS: AP and too low energy AP portable supine imaging of the chest.   Interval endotracheal tube placement which terminates approximately 6 cm above the aida.   Enteric tube courses below the diaphragm with side port noted below the diaphragm in the left upper quadrant. Tip not visualized on current imaging.   CARDIOMEDIASTINAL SILHOUETTE: Cardiomediastinal silhouette is normal in size and  configuration.   LUNGS: Lungs appear mildly hyperinflated. Coarse nodular opacities overlie the right lung apex possibly calcified granuloma. Mild nonspecific interstitial prominence and blunting of the left costophrenic angle. Otherwise no sizable pleural effusion, consolidation or pneumothorax identified.   ABDOMEN: No remarkable upper abdominal findings.   BONES: No acute osseous changes.       1.  Hyperinflated lungs suggestive of emphysema or COPD. 2. Medical devices as detailed. 2 cm advancement of the endotracheal tube may be considered.       MACRO: None   Signed by: Amanda Braun 10/21/2023 3:35 AM Dictation workstation:   [id number]    XR chest 1 view    Result Date: 10/21/2023  Interpreted By:  Amanda Braun, STUDY: XR CHEST 1 VIEW;  10/21/2023 2:40 am   INDICATION: Signs/Symptoms:Aspiration.   COMPARISON: 12/13/2022   ACCESSION NUMBER(S): [id number]   ORDERING CLINICIAN: ANNE KRISHNAMURTHY   FINDINGS: AP radiograph of the chest was provided.       CARDIOMEDIASTINAL SILHOUETTE: Cardiomediastinal silhouette is normal in size and configuration.   LUNGS: Lung fields are hyperinflated. Coarse interstitial opacities noted. Right upper lobe dense nodule wall possibly calcified similar to prior imaging. Mild blunting of the left costophrenic angle and atelectasis. No focal consolidation, pleural effusion or sizable pneumothorax seen.   ABDOMEN: No remarkable upper abdominal findings.   BONES: No acute osseous changes.       1.  Hyperinflated lungs suggestive of emphysema or COPD. Interstitial opacities which may be chronic however superimposed edema or infection not excluded.       MACRO: None   Signed by: Amanda Braun 10/21/2023 2:43 AM Dictation workstation:   [id number]     I have personally reviewed the following imaging results CT angio chest for pulmonary embolism    Result Date: 10/21/2023  Interpreted By:  Alida Abbott, STUDY: CT ANGIO CHEST FOR PULMONARY EMBOLISM;  10/21/2023 5:00 am    INDICATION: Signs/Symptoms:Respiratory distress.   COMPARISON: 12/13/2022   ACCESSION NUMBER(S): [id number]   ORDERING CLINICIAN: ANNE KRISHNAMURTHY   TECHNIQUE: Axial CT images of the chest obtained  after intravenous administration of contrast. Maximum intensity projection images were created and reviewed.   FINDINGS: VESSELS: No aortic aneurysm. No pulmonary embolism. HEART: Normal size.  No pericardial effusion. MEDIASTINUM AND PEDRO: No pathologically enlarged lymph nodes. LUNG, PLEURA, AND LARGE AIRWAYS: No pleural effusion or pneumothorax. No pulmonary consolidation or suspicious pulmonary nodule. Severe emphysema. Right apical calcified granuloma. The endotracheal tube terminates between the clavicular heads and aortic arch. CHEST WALL AND LOWER NECK: Within normal limits.   UPPER ABDOMEN: No acute abnormality of the visualized abdomen. Splenic calcified granulomas noted. Nasogastric tube extends into the gastric body. Low attenuation of the liver suggestive of hepatic steatosis.   BONES: No acute osseous abnormality.       No pulmonary embolism or acute cardiopulmonary process.   Emphysema.   MACRO: None   Signed by: Alida Abbott 10/21/2023 5:41 AM Dictation workstation:   [id number]    CT head wo IV contrast    Result Date: 10/21/2023  Interpreted By:  Alida Abbott, STUDY: CT HEAD WO IV CONTRAST;  10/21/2023 4:59 am   INDICATION: Signs/Symptoms:Altered mental status.   COMPARISON: None.   ACCESSION NUMBER(S): [id number]   ORDERING CLINICIAN: ANNE KRISHNAMURTHY   TECHNIQUE: Axial noncontrast CT images of the head.   FINDINGS: BRAIN PARENCHYMA: There is generalized effacement of cerebral sulci, suggesting cerebral edema. Gray-white matter interfaces are preserved. No mass effect or midline shift.   HEMORRHAGE: No acute intracranial hemorrhage. VENTRICLES and EXTRA-AXIAL SPACES: The ventricles and sulci are within normal limits in size for brain volume. No abnormal extraaxial fluid collection. EXTRACRANIAL  SOFT TISSUES: Within normal limits. PARANASAL SINUSES/MASTOIDS: Scattered opacification of the ethmoid air cells. The visualized paranasal sinuses and mastoid air cells are aerated. CALVARIUM: No depressed skull fracture. No destructive osseous lesion.   OTHER FINDINGS: None.       Generalized sulcal effacement suggestive of diffuse cerebral edema. Gray-white differentiation is grossly preserved, however, early or mild hypoxic ischemic encephalopathy is not excluded. Consider short-term follow-up CT.   No acute intracranial hemorrhage or midline shift.     MACRO: None   Signed by: Alida Abbott 10/21/2023 5:35 AM Dictation workstation:   [id number]    XR chest 1 view    Result Date: 10/21/2023  Interpreted By:  Amanda Braun, STUDY: XR CHEST 1 VIEW;  10/21/2023 3:24 am   INDICATION: Signs/Symptoms:Postintubation.   COMPARISON: 12/13/2022, 10/21/2023   ACCESSION NUMBER(S): [id number]   ORDERING CLINICIAN: ANNE KRISHNAMURTHY   FINDINGS: AP and too low energy AP portable supine imaging of the chest.   Interval endotracheal tube placement which terminates approximately 6 cm above the aida.   Enteric tube courses below the diaphragm with side port noted below the diaphragm in the left upper quadrant. Tip not visualized on current imaging.   CARDIOMEDIASTINAL SILHOUETTE: Cardiomediastinal silhouette is normal in size and configuration.   LUNGS: Lungs appear mildly hyperinflated. Coarse nodular opacities overlie the right lung apex possibly calcified granuloma. Mild nonspecific interstitial prominence and blunting of the left costophrenic angle. Otherwise no sizable pleural effusion, consolidation or pneumothorax identified.   ABDOMEN: No remarkable upper abdominal findings.   BONES: No acute osseous changes.       1.  Hyperinflated lungs suggestive of emphysema or COPD. 2. Medical devices as detailed. 2 cm advancement of the endotracheal tube may be considered.       MACRO: None   Signed by: Amanda Braun 10/21/2023  3:35 AM Dictation workstation:   [id number]    XR chest 1 view    Result Date: 10/21/2023  Interpreted By:  Amanda Braun, STUDY: XR CHEST 1 VIEW;  10/21/2023 2:40 am   INDICATION: Signs/Symptoms:Aspiration.   COMPARISON: 12/13/2022   ACCESSION NUMBER(S): [id number]   ORDERING CLINICIAN: ANNE KRISHNAMURTHY   FINDINGS: AP radiograph of the chest was provided.       CARDIOMEDIASTINAL SILHOUETTE: Cardiomediastinal silhouette is normal in size and configuration.   LUNGS: Lung fields are hyperinflated. Coarse interstitial opacities noted. Right upper lobe dense nodule wall possibly calcified similar to prior imaging. Mild blunting of the left costophrenic angle and atelectasis. No focal consolidation, pleural effusion or sizable pneumothorax seen.   ABDOMEN: No remarkable upper abdominal findings.   BONES: No acute osseous changes.       1.  Hyperinflated lungs suggestive of emphysema or COPD. Interstitial opacities which may be chronic however superimposed edema or infection not excluded.       MACRO: None   Signed by: Amanda Braun 10/21/2023 2:43 AM Dictation workstation:   [id number]  .      Assessment/Plan   Impression.  1.  History of acute change in mental status most likely acute metabolic toxic encephalopathy due to drug overdose    2.  History of respiratory failure    3.  History of opioid abuse    Plan.  Continue with current treatment.  Patient got 1 dose of mannitol and has been back to baseline continue with supportive care and treatment of underlying medical condition.  Recommend a social service consult canceled for drug rehabilitation.  Will sign off    Call with any change in neuro status.    Due to technical limitations of voice recognition and human error, this note may not accurately reflect the care of the patient.                   Arnold Trivedi MD

## 2023-10-21 NOTE — ED PROVIDER NOTES
HPI   Chief Complaint   Patient presents with    Shortness of Breath        Chief complaint unresponsive  History of present illness this is a 53-year-old female who has a history of previous stroke.  Apparently she was in the bathroom become unresponsive she takes both methadone and occasionally she abuses heroin.  She was given Narcan and was brought here for assessment.  Came to there was no stroke symptoms.  Her pulse of 152 blood pressure 133/77 O2 saturation 93% respiratory rate 19                          [place] Coma Scale Score: 11                  Patient History   Past Medical History:   Diagnosis Date    Carpal tunnel syndrome, right upper limb     Carpal tunnel syndrome of right wrist    Cervical disc disorder with myelopathy, unspecified cervical region     Degeneration of cervical intervertebral disc with myelopathy    Encounter for screening for malignant neoplasm of cervix 11/21/2018    Screening for cervical cancer    Encounter for screening, unspecified 11/21/2018    Screening for condition    Personal history of other diseases of the circulatory system     History of cardiac disorder    Personal history of other diseases of the musculoskeletal system and connective tissue     History of fibromyalgia    Personal history of other diseases of the musculoskeletal system and connective tissue     History of chronic back pain    Personal history of other diseases of the nervous system and sense organs     History of restless legs syndrome    Personal history of other diseases of the respiratory system     History of chronic obstructive lung disease    Personal history of other mental and behavioral disorders     History of depression    Personal history of other specified conditions     History of headache    Personal history of pneumonia (recurrent) 10/10/2019    History of community acquired pneumonia    Radiculopathy, cervical region     Cervical radiculopathy at C6     Past Surgical History:    Procedure Laterality Date    OTHER SURGICAL HISTORY  11/07/2018    Hysterectomy     Family History   Problem Relation Name Age of Onset    Cancer Mother      Diabetes Mother          OTHER TYPE WITH ORAL COMPLICATION, WITHOUT LONG-TERM CURRENT USE OF INSULIN    Stroke Father      Other (CARDIAC DISORDER) Father      Hypertension Sister      Hypertension Brother      Diabetes Brother          OTHER TYPE WITH ORAL COMPLICATION, WITHOUT LONG-TERM CURRENT USE OF INSULIN    Cancer Maternal Grandmother      Other (CARDIAC DISORDER) Paternal Grandmother      Heart attack Other AUNT     Cancer Other UNCLE     Other (CARDIAC DISORDER) Other UNCLE      Social History     Tobacco Use    Smoking status: Every Day     Packs/day: 1.00     Years: 15.00     Additional pack years: 0.00     Total pack years: 15.00     Types: Cigarettes    Smokeless tobacco: Never   Substance Use Topics    Alcohol use: Not Currently    Drug use: Not on file       Physical Exam   ED Triage Vitals   Temp Pulse Resp BP   -- -- -- --      SpO2 Temp src Heart Rate Source Patient Position   -- -- -- --      BP Location FiO2 (%)     -- --       Physical Exam  Vitals and nursing note reviewed. Exam conducted with a chaperone present.   Constitutional:       General: She is in acute distress.      Appearance: Normal appearance. She is ill-appearing.   HENT:      Head: Normocephalic and atraumatic.      Nose: Nose normal.      Mouth/Throat:      Mouth: Mucous membranes are moist.   Eyes:      Pupils: Pupils are equal, round, and reactive to light.   Cardiovascular:      Rate and Rhythm: Regular rhythm. Tachycardia present.   Pulmonary:      Effort: Respiratory distress present.      Breath sounds: Wheezing, rhonchi and rales present.   Abdominal:      Palpations: Abdomen is soft.   Musculoskeletal:         General: Normal range of motion.      Cervical back: Normal range of motion.   Skin:     General: Skin is dry.      Capillary Refill: Capillary refill  takes 2 to 3 seconds.   Neurological:      General: No focal deficit present.      Mental Status: She is alert. She is disoriented.      Motor: Weakness present.         ED Course & MDM   Diagnoses as of 10/21/23 0545   Acute respiratory failure with hypoxia (CMS/HCC)   Somnolence       Medical Decision Making  Differential diagnosis  #1 arrhythmia  #2 illicit drug use  #3 dehydration  #4 anemia  #5 aspiration  Considering the above differential diagnosis following tests and treatments were considered in order with shared decision making  Cardiac monitor IV fluids  EKG chest x-ray arterial blood gas  CBC electrolytes troponin drug screen alcohol level magnesium    Amount and/or Complexity of Data Reviewed  Radiology: ordered and independent interpretation performed.     Details: CTCHEST NEGATIVE , CT HEAD NEGATIVE  Discussion of management or test interpretation with external provider(s): Critical care nurse practitioner was contacted REUBEN CALLED    Risk  Decision regarding hospitalization.  Risk Details: Concerning this patient's presentation had respiratory failure with hypoxia patient was intubated blood cultures were done IV antibiotics were given chest x-ray was done subsequently to verify placement proceeded to do a CT of the head and a CT of the chest PE protocol      Labs Reviewed   BLOOD GAS ARTERIAL FULL PANEL - Abnormal       Result Value    POCT pH, Arterial 7.32 (*)     POCT pCO2, Arterial 89 (*)     POCT pO2, Arterial 62 (*)     POCT SO2, Arterial 93 (*)     POCT Oxy Hemoglobin, Arterial 82.6 (*)     POCT Hematocrit Calculated, Arterial 53.0 (*)     POCT Sodium, Arterial 131 (*)     POCT Potassium, Arterial 4.7      POCT Chloride, Arterial 92 (*)     POCT Ionized Calcium, Arterial 1.20      POCT Glucose, Arterial 99      POCT Lactate, Arterial 1.4      POCT Base Excess, Arterial 14.0 (*)     POCT HCO3 Calculated, Arterial 45.9 (*)     POCT Hemoglobin, Arterial 17.5 (*)     POCT Anion Gap, Arterial -2  (*)     Patient Temperature        FiO2 44      Apparatus CANNULA      Critical Called By SABINE RRT      Critical Called To RAGHU EGAN      Critical Call Time [phone]      Critical Read Back Y     CBC WITH AUTO DIFFERENTIAL - Abnormal    WBC 16.9 (*)     nRBC 0.0      RBC 5.32 (*)     Hemoglobin 17.2 (*)     Hematocrit 52.5 (*)     MCV 99      MCH 32.3      MCHC 32.8      RDW 12.4      Platelets 180      MPV 12.6 (*)     Neutrophils % 70.0      Immature Granulocytes %, Automated 0.4      Lymphocytes % 18.0      Monocytes % 9.0      Eosinophils % 1.7      Basophils % 0.9      Neutrophils Absolute 11.82 (*)     Immature Granulocytes Absolute, Automated 0.06      Lymphocytes Absolute 3.03      Monocytes Absolute 1.52 (*)     Eosinophils Absolute 0.28      Basophils Absolute 0.15 (*)    COMPREHENSIVE METABOLIC PANEL - Abnormal    Glucose 85      Sodium 136      Potassium 4.2      Chloride 89 (*)     Bicarbonate 39 (*)     Anion Gap 12      Urea Nitrogen 17      Creatinine 0.57      eGFR >90      Calcium 9.8      Albumin 4.4      Alkaline Phosphatase 72      Total Protein 7.7      AST 21      Bilirubin, Total 0.5      ALT 27     LIPASE - Abnormal    Lipase <3 (*)     Narrative:     Venipuncture immediately after or during the administration of Metamizole may lead to falsely low results. Testing should be performed immediately prior to Metamizole dosing.   DRUG SCREEN,URINE - Abnormal    Amphetamine Screen, Urine Presumptive Negative      Barbiturate Screen, Urine Presumptive Negative      Benzodiazepines Screen, Urine Presumptive Negative      Cannabinoid Screen, Urine Presumptive Negative      Cocaine Metabolite Screen, Urine Presumptive Negative      Fentanyl Screen, Urine Presumptive Positive (*)     Opiate Screen, Urine Presumptive Positive (*)     Oxycodone Screen, Urine Presumptive Negative      PCP Screen, Urine Presumptive Negative      Narrative:     Drug screen results are presumptive and should not be used to  assess   compliance with prescribed medication. Contact the performing [facility] laboratory   to add-on definitive confirmatory testing if clinically indicated.    Toxicology screening results are reported qualitatively. The concentration must   be greater than or equal to the cutoff to be reported as positive. The concentration   at which the screening test can detect an individual drug or metabolite varies.   The absence of expected drug(s) and/or drug metabolite(s) may indicate non-compliance,   inappropriate timing of specimen collection relative to drug administration, poor drug   absorption, diluted/adulterated urine, or limitations of testing. For medical purposes   only; not valid for forensic use.    Interpretive questions should be directed to the laboratory medical directors.   TROPONIN I, HIGH SENSITIVITY - Abnormal    Troponin I, High Sensitivity 48 (*)     Narrative:     Less than 99th percentile of normal range cutoff-  Female and children under 18 years old <14 ng/L; Male <21 ng/L: Negative  Repeat testing should be performed if clinically indicated.     Female and children under 18 years old 14-50 ng/L; Male 21-50 ng/L:  Consistent with possible cardiac damage and possible increased clinical   risk. Serial measurements may help to assess extent of myocardial damage.     >50 ng/L: Consistent with cardiac damage, increased clinical risk and  myocardial infarction. Serial measurements may help assess extent of   myocardial damage.      NOTE: Children less than 1 year old may have higher baseline troponin   levels and results should be interpreted in conjunction with the overall   clinical context.     NOTE: Troponin I testing is performed using a different   testing methodology at [facility] ([facility] than at other   [facility]. Direct result comparisons should only   be made within the same method.   POCT GLUCOSE - Abnormal    POCT Glucose 114 (*)    MAGNESIUM - Normal    Magnesium 1.76      LACTATE - Normal    Lactate 1.4      Narrative:     Venipuncture immediately after or during the administration of Metamizole may lead to falsely low results. Testing should be performed immediately  prior to Metamizole dosing.   AMMONIA - Normal    Ammonia 30     BLOOD CULTURE   BLOOD CULTURE        CT angio chest for pulmonary embolism   Final Result   No pulmonary embolism or acute cardiopulmonary process.        Emphysema.        MACRO:   None        Signed by: Alida Abbott 10/21/2023 5:41 AM   Dictation workstation:   [id number]      CT head wo IV contrast   Final Result   Generalized sulcal effacement suggestive of diffuse cerebral edema.   Gray-white differentiation is grossly preserved, however, early or   mild hypoxic ischemic encephalopathy is not excluded. Consider   short-term follow-up CT.        No acute intracranial hemorrhage or midline shift.             MACRO:   None        Signed by: Alida Abbott 10/21/2023 5:35 AM   Dictation workstation:   [id number]      XR chest 1 view   Final Result   1.  Hyperinflated lungs suggestive of emphysema or COPD.   2. Medical devices as detailed. 2 cm advancement of the endotracheal   tube may be considered.                  MACRO:   None        Signed by: Amanda Braun 10/21/2023 3:35 AM   Dictation workstation:   [id number]      XR chest 1 view   Final Result   1.  Hyperinflated lungs suggestive of emphysema or COPD. Interstitial   opacities which may be chronic however superimposed edema or   infection not excluded.                  MACRO:   None        Signed by: Amanda Braun 10/21/2023 2:43 AM   Dictation workstation:   [id number]          she gets opiates positive she got the fentanyl positive patient on aware she is alcoholic cardiac    Procedure  ECG 12 lead    Performed by: Matt Sharpe MD  Authorized by: Matt Sharpe MD    ECG reviewed by ED Physician in the absence of a cardiologist: yes    Rate:     ECG rate:  153    ECG rate  assessment: tachycardic    Rhythm:     Rhythm: sinus rhythm    Ectopy:     Ectopy: none    QRS:     QRS axis:  Normal  ST segments:     ST segments:  Normal  T waves:     T waves: normal    Intubation    Performed by: Matt Sharpe MD  Authorized by: Matt Sharpe MD    Consent:     Consent obtained:  Emergent situation    Risks discussed:  Aspiration and hypoxia  Universal protocol:     Relevant documents present and verified: yes      Test results available: yes      Imaging studies available: yes      Patient identity confirmed:  Anonymous protocol, patient vented/unresponsive  Pre-procedure details:     Indications: altered consciousness and respiratory failure      Mouth opening - incisor distance:  3 or more finger widths    Hyoid-mental distance: 3 or more finger widths      Hyoid-thyroid distance: 2 or more finger widths      Mallampati score:  II    Obstruction: none      Neck mobility: normal      Pharmacologic strategy: RSI      Induction agents:  Etomidate    Paralytics:  Succinylcholine  Procedure details:     Preoxygenation:  Bag valve mask    CPR in progress: no      Number of attempts:  1  Successful intubation attempt details:     Intubation method:  Oral    Intubation technique: video assisted      Laryngoscope blade:  Mac 3    Bougie used: no      Grade view: II      Tube size (mm):  7.5    Tube type:  Cuffed    Tube visualized through cords: yes    Placement assessment:     ETT at teeth/gumline (cm):  23    Tube secured with:  ETT amaro    Breath sounds:  Equal    Placement verification: chest rise, colorimetric ETCO2 and CXR verification      CXR findings:  Appropriate position  Post-procedure details:     Procedure completion:  Tolerated well, no immediate complications       Matt Sharpe MD  10/21/23 0430       Matt Sharpe MD  10/21/23 0523       Matt Sharpe MD  10/21/23 0531

## 2023-10-21 NOTE — H&P
[facility] Critical Care Medicine       Date:  10/21/2023  Patient:  Radha Locke  YOB: 1970  MRN:  [id number]   Admit Date:  10/21/2023    Chief Complaint   Patient presents with    Shortness of Breath         History of Present Illness:  Radha Locke is a 53 y.o. year old female patient with PMH of COPD and opiate abuse who presents to the ED with chief complaint of unresponsiveness. Patient apparently was found in the bathroom at home unresponsive at home. EMS provided narcan with no improvement. On arrival to the ED patient was alert but became more lethargic requiring intubation. Further detail are unavailable at this time. ROS deferred d/t patient being intubated and sedated.     Upon presentation emergency department she is tachycardic on her 49, RR 29, /77, 95% on nonrebreather.  WBC 16.9, hemoglobin 17.2, hematocrit 52.5, platelet count 180 and neutrophil count 1.82.  Sodium 136, potassium 4.2, chloride 89, bicarb 39, anion gap 12, BUN 17, creatinine 0.57 GFR greater than 90.  LFTs within normal's.  Magnesium 1.76.  Lactate 1.4.  Troponin 48.  Lipase is normal.  ABGs show a pH 7.32, PCO2 89, PO2 62, bicarb 45.9.  Tox screen positive for opiates and fentanyl.  Single view of the chest hyperinflated lungs suggestive of COPD.  Interstitial opacities may indicate edema or infection.  CT angio chest and head are pending.    Interval ICU Events:      Medical History:  Past Medical History:   Diagnosis Date    Carpal tunnel syndrome, right upper limb     Carpal tunnel syndrome of right wrist    Cervical disc disorder with myelopathy, unspecified cervical region     Degeneration of cervical intervertebral disc with myelopathy    Encounter for screening for malignant neoplasm of cervix 11/21/2018    Screening for cervical cancer    Encounter for screening, unspecified 11/21/2018    Screening for condition    Personal history of other diseases of the circulatory system     History of cardiac  disorder    Personal history of other diseases of the musculoskeletal system and connective tissue     History of fibromyalgia    Personal history of other diseases of the musculoskeletal system and connective tissue     History of chronic back pain    Personal history of other diseases of the nervous system and sense organs     History of restless legs syndrome    Personal history of other diseases of the respiratory system     History of chronic obstructive lung disease    Personal history of other mental and behavioral disorders     History of depression    Personal history of other specified conditions     History of headache    Personal history of pneumonia (recurrent) 10/10/2019    History of community acquired pneumonia    Radiculopathy, cervical region     Cervical radiculopathy at C6     Past Surgical History:   Procedure Laterality Date    OTHER SURGICAL HISTORY  11/07/2018    Hysterectomy     (Not in a hospital admission)    Hydrocodone, Hydrocodone-acetaminophen, Penicillins, and Tramadol  Social History     Tobacco Use    Smoking status: Every Day     Packs/day: 1.00     Years: 15.00     Additional pack years: 0.00     Total pack years: 15.00     Types: Cigarettes    Smokeless tobacco: Never   Substance Use Topics    Alcohol use: Not Currently     Family History   Problem Relation Name Age of Onset    Cancer Mother      Diabetes Mother          OTHER TYPE WITH ORAL COMPLICATION, WITHOUT LONG-TERM CURRENT USE OF INSULIN    Stroke Father      Other (CARDIAC DISORDER) Father      Hypertension Sister      Hypertension Brother      Diabetes Brother          OTHER TYPE WITH ORAL COMPLICATION, WITHOUT LONG-TERM CURRENT USE OF INSULIN    Cancer Maternal Grandmother      Other (CARDIAC DISORDER) Paternal Grandmother      Heart attack Other AUNT     Cancer Other UNCLE     Other (CARDIAC DISORDER) Other UNCLE        Hospital Medications:    dexmedeTOMIDine, 0.1-1.5 mcg/kg/hr, Last Rate: 400 mcg (10/21/23  0347)  propofol, 5-20 mcg/kg/min (Order-Specific), Last Rate: 5 mcg/kg/min (10/21/23 0349)          Current Facility-Administered Medications:     chlorhexidine (Peridex) 0.12 % solution 15 mL, 15 mL, Mouth/Throat, TID, POLINA Driver    dexmedeTOMIDine in NS (Precedex) 400 mcg in 100 mL (4 mcg/mL) infusion, 0.1-1.5 mcg/kg/hr, intravenous, Continuous, Matt Sharpe MD, Last Rate: 4.84 mL/hr at 10/21/23 0347, 400 mcg at 10/21/23 0347    enoxaparin (Lovenox) syringe 40 mg, 40 mg, subcutaneous, q24h, POLINA Driver    oxygen (O2) therapy, , inhalation, Continuous PRN - O2/gases, Matt Sharpe MD, Start at 10/21/23 0320    oxygen (O2) therapy, , inhalation, Continuous PRN - O2/gases, POLINA Driver    propofol (Diprivan) infusion, 5-20 mcg/kg/min (Order-Specific), intravenous, Continuous, Matt Sharpe MD, Last Rate: 1.94 mL/hr at 10/21/23 0349, 5 mcg/kg/min at 10/21/23 0349    Current Outpatient Medications:     albuterol 90 mcg/actuation inhaler, Inhale 4 times a day as needed for shortness of breath., Disp: , Rfl:     azithromycin (Zithromax) 250 mg tablet, take 1 tablet (250 mg) by mouth every Monday, Wednesday, and Friday for COPD exacerbation prevention, Disp: , Rfl:     clindamycin (Cleocin) 300 mg capsule, Take 1 capsule (300 mg) by mouth in the morning and 1 capsule (300 mg) at noon and 1 capsule (300 mg) in the evening and 1 capsule (300 mg) before bedtime., Disp: , Rfl:     ipratropium-albuteroL (Duo-Neb) 0.5-2.5 mg/3 mL nebulizer solution, Inhale 4 times a day. USE 1 VIAL VIA NEBULIZER, Disp: , Rfl:     lidocaine (Lidoderm) 5 % patch, 1 patch. APPLY TO THE AFFECTED AREA DAILY, LEAVE IN PLACE FOR 12 HOURS, THEN REMOVE AND LEAVE OFFF FOR 12 HOURS, Disp: , Rfl:     methadone (Dolophine) 10 mg tablet, Take 1 tablet (10 mg) by mouth once daily., Disp: , Rfl:     metoprolol succinate XL (Toprol-XL) 50 mg 24 hr tablet, Take 1 tablet (50 mg) by mouth once daily., Disp:  , Rfl:     nicotine polacrilex (Nicorette) 4 mg lozenge, Dissolve 1 lozenge (4 mg) in the mouth every 2 hours if needed for smoking cessation., Disp: 72 lozenge, Rfl: 2    predniSONE (Deltasone) 5 mg tablet, Take 1 tablet (5 mg) by mouth once daily., Disp: , Rfl:     pregabalin (Lyrica) 150 mg capsule, Take 1 capsule (150 mg) by mouth in the morning and 1 capsule (150 mg) in the evening and 1 capsule (150 mg) before bedtime., Disp: , Rfl:     Stiolto Respimat 2.5-2.5 mcg/actuation mist inhaler, inhale 2 puffs by inhalation route once daily at the same time each day, Disp: , Rfl:     tiotropium (Spiriva Respimat) 2.5 mcg/actuation inhaler, Inhale 2 puffs once daily., Disp: , Rfl:     Physical Exam:    Heart Rate:  [127-152]   Resp:  [19-29]   BP: ()/(54-77)   Weight:  [64.5 kg (142 lb 3.2 oz)]   SpO2:  [93 %-98 %]     Vent Mode: Volume control/assist control  FiO2 (%):  [50 %] 50 %  S RR:  [20] 20  S VT:  [400 mL] 400 mL  PEEP/CPAP (cm H2O):  [5 cm H20] 5 cm H20  MAP (cm H2O):  [13] 13    Physical Exam  Constitutional:       Appearance: She is ill-appearing and toxic-appearing.      Interventions: She is intubated.   HENT:      Head: Normocephalic and atraumatic.      Nose: Nose normal.   Cardiovascular:      Rate and Rhythm: Normal rate and regular rhythm.      Pulses: Normal pulses.      Heart sounds: Normal heart sounds.   Pulmonary:      Effort: She is intubated.      Breath sounds: No decreased air movement. Examination of the right-lower field reveals decreased breath sounds. Examination of the left-lower field reveals decreased breath sounds. Decreased breath sounds present. No wheezing, rhonchi or rales.      Comments: 7.5 Fr ETT. LL @ 21 cm    Abdominal:      General: Abdomen is flat. Bowel sounds are normal. There is no distension or abdominal bruit.      Palpations: Abdomen is soft.   Musculoskeletal:      Right lower leg: No edema.      Left lower leg: No edema.   Skin:     General: Skin is warm and  dry.      Capillary Refill: Capillary refill takes less than 2 seconds.   Neurological:      Comments: KOSTA, intubated and sedated.        Objective:    No intake or output data in the 24 hours ending 10/21/23 0507    Results for orders placed or performed during the hospital encounter of 10/21/23 (from the past 24 hour(s))   CBC and Auto Differential   Result Value Ref Range    WBC 16.9 (H) 4.4 - 11.3 x10*3/uL    nRBC 0.0 0.0 - 0.0 /100 WBCs    RBC 5.32 (H) 4.00 - 5.20 x10*6/uL    Hemoglobin 17.2 (H) 12.0 - 16.0 g/dL    Hematocrit 52.5 (H) 36.0 - 46.0 %    MCV 99 80 - 100 fL    MCH 32.3 26.0 - 34.0 pg    MCHC 32.8 32.0 - 36.0 g/dL    RDW 12.4 11.5 - 14.5 %    Platelets 180 150 - 450 x10*3/uL    MPV 12.6 (H) 7.5 - 11.5 fL    Neutrophils % 70.0 40.0 - 80.0 %    Immature Granulocytes %, Automated 0.4 0.0 - 0.9 %    Lymphocytes % 18.0 13.0 - 44.0 %    Monocytes % 9.0 2.0 - 10.0 %    Eosinophils % 1.7 0.0 - 6.0 %    Basophils % 0.9 0.0 - 2.0 %    Neutrophils Absolute 11.82 (H) 1.20 - 7.70 x10*3/uL    Immature Granulocytes Absolute, Automated 0.06 0.00 - 0.70 x10*3/uL    Lymphocytes Absolute 3.03 1.20 - 4.80 x10*3/uL    Monocytes Absolute 1.52 (H) 0.10 - 1.00 x10*3/uL    Eosinophils Absolute 0.28 0.00 - 0.70 x10*3/uL    Basophils Absolute 0.15 (H) 0.00 - 0.10 x10*3/uL   Magnesium   Result Value Ref Range    Magnesium 1.76 1.60 - 2.40 mg/dL   Comprehensive metabolic panel   Result Value Ref Range    Glucose 85 74 - 99 mg/dL    Sodium 136 136 - 145 mmol/L    Potassium 4.2 3.5 - 5.3 mmol/L    Chloride 89 (L) 98 - 107 mmol/L    Bicarbonate 39 (H) 21 - 32 mmol/L    Anion Gap 12 10 - 20 mmol/L    Urea Nitrogen 17 6 - 23 mg/dL    Creatinine 0.57 0.50 - 1.05 mg/dL    eGFR >90 >60 mL/min/1.73m*2    Calcium 9.8 8.6 - 10.3 mg/dL    Albumin 4.4 3.4 - 5.0 g/dL    Alkaline Phosphatase 72 33 - 110 U/L    Total Protein 7.7 6.4 - 8.2 g/dL    AST 21 9 - 39 U/L    Bilirubin, Total 0.5 0.0 - 1.2 mg/dL    ALT 27 7 - 45 U/L   Lipase   Result  Value Ref Range    Lipase <3 (L) 9 - 82 U/L   Lactate   Result Value Ref Range    Lactate 1.4 0.4 - 2.0 mmol/L   Troponin I, High Sensitivity   Result Value Ref Range    Troponin I, High Sensitivity 48 (H) 0 - 13 ng/L   Ammonia   Result Value Ref Range    Ammonia 30 16 - 53 umol/L   BLOOD GAS ARTERIAL FULL PANEL   Result Value Ref Range    POCT pH, Arterial 7.32 (L) 7.38 - 7.42 pH    POCT pCO2, Arterial 89 (HH) 38 - 42 mm Hg    POCT pO2, Arterial 62 (L) 85 - 95 mm Hg    POCT SO2, Arterial 93 (L) 94 - 100 %    POCT Oxy Hemoglobin, Arterial 82.6 (L) 94.0 - 98.0 %    POCT Hematocrit Calculated, Arterial 53.0 (H) 36.0 - 46.0 %    POCT Sodium, Arterial 131 (L) 136 - 145 mmol/L    POCT Potassium, Arterial 4.7 3.5 - 5.3 mmol/L    POCT Chloride, Arterial 92 (L) 98 - 107 mmol/L    POCT Ionized Calcium, Arterial 1.20 1.10 - 1.33 mmol/L    POCT Glucose, Arterial 99 74 - 99 mg/dL    POCT Lactate, Arterial 1.4 0.4 - 2.0 mmol/L    POCT Base Excess, Arterial 14.0 (H) -2.0 - 3.0 mmol/L    POCT HCO3 Calculated, Arterial 45.9 (H) 22.0 - 26.0 mmol/L    POCT Hemoglobin, Arterial 17.5 (H) 12.0 - 16.0 g/dL    POCT Anion Gap, Arterial -2 (L) 10 - 25 mmo/L    Patient Temperature      FiO2 44 %    Apparatus CANNULA     Critical Called By SABINE RRT     Critical Called To RAGHU EGAN     Critical Call Time [phone]     Critical Read Back Y    POCT GLUCOSE   Result Value Ref Range    POCT Glucose 114 (H) 74 - 99 mg/dL   Drug Screen, Urine   Result Value Ref Range    Amphetamine Screen, Urine Presumptive Negative Presumptive Negative    Barbiturate Screen, Urine Presumptive Negative Presumptive Negative    Benzodiazepines Screen, Urine Presumptive Negative Presumptive Negative    Cannabinoid Screen, Urine Presumptive Negative Presumptive Negative    Cocaine Metabolite Screen, Urine Presumptive Negative Presumptive Negative    Fentanyl Screen, Urine Presumptive Positive (A) Presumptive Negative    Opiate Screen, Urine Presumptive Positive (A)  Presumptive Negative    Oxycodone Screen, Urine Presumptive Negative Presumptive Negative    PCP Screen, Urine Presumptive Negative Presumptive Negative       CT angio chest for pulmonary embolism    Result Date: 10/21/2023  Interpreted By:  Alida Abbott, STUDY: CT ANGIO CHEST FOR PULMONARY EMBOLISM;  10/21/2023 5:00 am   INDICATION: Signs/Symptoms:Respiratory distress.   COMPARISON: 12/13/2022   ACCESSION NUMBER(S): [id number]   ORDERING CLINICIAN: ANNE KRISHNAMURTHY   TECHNIQUE: Axial CT images of the chest obtained  after intravenous administration of contrast. Maximum intensity projection images were created and reviewed.   FINDINGS: VESSELS: No aortic aneurysm. No pulmonary embolism. HEART: Normal size.  No pericardial effusion. MEDIASTINUM AND PEDRO: No pathologically enlarged lymph nodes. LUNG, PLEURA, AND LARGE AIRWAYS: No pleural effusion or pneumothorax. No pulmonary consolidation or suspicious pulmonary nodule. Severe emphysema. Right apical calcified granuloma. The endotracheal tube terminates between the clavicular heads and aortic arch. CHEST WALL AND LOWER NECK: Within normal limits.   UPPER ABDOMEN: No acute abnormality of the visualized abdomen. Splenic calcified granulomas noted. Nasogastric tube extends into the gastric body. Low attenuation of the liver suggestive of hepatic steatosis.   BONES: No acute osseous abnormality.       No pulmonary embolism or acute cardiopulmonary process.   Emphysema.   MACRO: None   Signed by: Alida Abbott 10/21/2023 5:41 AM Dictation workstation:   [id number]    CT head wo IV contrast    Result Date: 10/21/2023  Interpreted By:  Alida Abbott, STUDY: CT HEAD WO IV CONTRAST;  10/21/2023 4:59 am   INDICATION: Signs/Symptoms:Altered mental status.   COMPARISON: None.   ACCESSION NUMBER(S): [id number]   ORDERING CLINICIAN: ANNE KRISHNAMURTHY   TECHNIQUE: Axial noncontrast CT images of the head.   FINDINGS: BRAIN PARENCHYMA: There is generalized effacement of  cerebral sulci, suggesting cerebral edema. Gray-white matter interfaces are preserved. No mass effect or midline shift.   HEMORRHAGE: No acute intracranial hemorrhage. VENTRICLES and EXTRA-AXIAL SPACES: The ventricles and sulci are within normal limits in size for brain volume. No abnormal extraaxial fluid collection. EXTRACRANIAL SOFT TISSUES: Within normal limits. PARANASAL SINUSES/MASTOIDS: Scattered opacification of the ethmoid air cells. The visualized paranasal sinuses and mastoid air cells are aerated. CALVARIUM: No depressed skull fracture. No destructive osseous lesion.   OTHER FINDINGS: None.       Generalized sulcal effacement suggestive of diffuse cerebral edema. Gray-white differentiation is grossly preserved, however, early or mild hypoxic ischemic encephalopathy is not excluded. Consider short-term follow-up CT.   No acute intracranial hemorrhage or midline shift.     MACRO: None   Signed by: Alida Abbott 10/21/2023 5:35 AM Dictation workstation:   [id number]    XR chest 1 view    Result Date: 10/21/2023  Interpreted By:  Amanda Braun, STUDY: XR CHEST 1 VIEW;  10/21/2023 3:24 am   INDICATION: Signs/Symptoms:Postintubation.   COMPARISON: 12/13/2022, 10/21/2023   ACCESSION NUMBER(S): [id number]   ORDERING CLINICIAN: ANNE KRISHNAMURTHY   FINDINGS: AP and too low energy AP portable supine imaging of the chest.   Interval endotracheal tube placement which terminates approximately 6 cm above the aida.   Enteric tube courses below the diaphragm with side port noted below the diaphragm in the left upper quadrant. Tip not visualized on current imaging.   CARDIOMEDIASTINAL SILHOUETTE: Cardiomediastinal silhouette is normal in size and configuration.   LUNGS: Lungs appear mildly hyperinflated. Coarse nodular opacities overlie the right lung apex possibly calcified granuloma. Mild nonspecific interstitial prominence and blunting of the left costophrenic angle. Otherwise no sizable pleural effusion,  consolidation or pneumothorax identified.   ABDOMEN: No remarkable upper abdominal findings.   BONES: No acute osseous changes.       1.  Hyperinflated lungs suggestive of emphysema or COPD. 2. Medical devices as detailed. 2 cm advancement of the endotracheal tube may be considered.       MACRO: None   Signed by: Amanda Braun 10/21/2023 3:35 AM Dictation workstation:   [id number]    XR chest 1 view    Result Date: 10/21/2023  Interpreted By:  Amanda Braun, STUDY: XR CHEST 1 VIEW;  10/21/2023 2:40 am   INDICATION: Signs/Symptoms:Aspiration.   COMPARISON: 12/13/2022   ACCESSION NUMBER(S): [id number]   ORDERING CLINICIAN: ANNE KRISHNAMURTHY   FINDINGS: AP radiograph of the chest was provided.       CARDIOMEDIASTINAL SILHOUETTE: Cardiomediastinal silhouette is normal in size and configuration.   LUNGS: Lung fields are hyperinflated. Coarse interstitial opacities noted. Right upper lobe dense nodule wall possibly calcified similar to prior imaging. Mild blunting of the left costophrenic angle and atelectasis. No focal consolidation, pleural effusion or sizable pneumothorax seen.   ABDOMEN: No remarkable upper abdominal findings.   BONES: No acute osseous changes.       1.  Hyperinflated lungs suggestive of emphysema or COPD. Interstitial opacities which may be chronic however superimposed edema or infection not excluded.       MACRO: None   Signed by: Amanda Braun 10/21/2023 2:43 AM Dictation workstation:   [id number]       No echocardiogram results found for the past 14 days    Assessment/Plan:    I am currently managing this critically ill patient for:    #Acute hypoxemic respiratory failure with hypercapnia 2/2 opiate overdose  #COPD  Admit to ICU for acute hypoxemic/hypercapnic respiratory failure   Unknown downtime   Patient intubated in ED d/t increasing lethargy   CT head shows diffuse cerebral edema   CT angio chest neg for acute pathology   Precedex for sedation   Hypotension in ED. Propofol  discontinued.     #Anoxic encephalopathy  CT head shows diffuse cerebral edema  Corneal reflexes intact, posturing+ w/ myoclonic jerking   Give one dose of mannitol   Will consult neurology     #Hypotension refractory to IVF's   Stop propofol for now  Start on norepinephrine with titration parameters     #Opiate overdose   Unknown downtime.   Was alert on arrival to ED and became more lethargic requiring intubation.    Drug screen positive for opiates and fentanyl     #Leukocytosis  CT chest neg. BC's obtained and pending. Lactic acid is normal.   Likely reactive   No indication for antibiotics at this time     DVT Prophylaxis: Lovenox subcutaneous   GI Prophylaxis: not indicated   Bowel Regimen: Yes  Diet: NPO  CVC: None  Huong: None  Grant: Yes   Restraints: Soft wrist while intubated  Code Status: Full Code  Dispo: TBD    Critical Care Time:  45 minutes     Lubna Panchal APRN-CNP

## 2023-10-22 VITALS
TEMPERATURE: 97.9 F | WEIGHT: 157.41 LBS | OXYGEN SATURATION: 94 % | SYSTOLIC BLOOD PRESSURE: 140 MMHG | HEIGHT: 65 IN | HEART RATE: 112 BPM | RESPIRATION RATE: 18 BRPM | DIASTOLIC BLOOD PRESSURE: 73 MMHG | BODY MASS INDEX: 26.23 KG/M2

## 2023-10-22 LAB
ALBUMIN SERPL BCP-MCNC: 3.2 G/DL (ref 3.4–5)
ALP SERPL-CCNC: 50 U/L (ref 33–110)
ALT SERPL W P-5'-P-CCNC: 29 U/L (ref 7–45)
ANION GAP SERPL CALC-SCNC: 7 MMOL/L (ref 10–20)
AST SERPL W P-5'-P-CCNC: 40 U/L (ref 9–39)
BASOPHILS # BLD AUTO: 0.05 X10*3/UL (ref 0–0.1)
BASOPHILS NFR BLD AUTO: 0.6 %
BILIRUB SERPL-MCNC: 0.8 MG/DL (ref 0–1.2)
BUN SERPL-MCNC: 9 MG/DL (ref 6–23)
CALCIUM SERPL-MCNC: 8.4 MG/DL (ref 8.6–10.3)
CHLORIDE SERPL-SCNC: 95 MMOL/L (ref 98–107)
CO2 SERPL-SCNC: 37 MMOL/L (ref 21–32)
CREAT SERPL-MCNC: 0.4 MG/DL (ref 0.5–1.05)
EOSINOPHIL # BLD AUTO: 0.17 X10*3/UL (ref 0–0.7)
EOSINOPHIL NFR BLD AUTO: 2 %
ERYTHROCYTE [DISTWIDTH] IN BLOOD BY AUTOMATED COUNT: 12.3 % (ref 11.5–14.5)
GFR SERPL CREATININE-BSD FRML MDRD: >90 ML/MIN/1.73M*2
GLUCOSE BLD MANUAL STRIP-MCNC: 108 MG/DL (ref 74–99)
GLUCOSE BLD MANUAL STRIP-MCNC: 114 MG/DL (ref 74–99)
GLUCOSE BLD MANUAL STRIP-MCNC: 121 MG/DL (ref 74–99)
GLUCOSE SERPL-MCNC: 74 MG/DL (ref 74–99)
HCT VFR BLD AUTO: 47.1 % (ref 36–46)
HGB BLD-MCNC: 15.1 G/DL (ref 12–16)
IMM GRANULOCYTES # BLD AUTO: 0.02 X10*3/UL (ref 0–0.7)
IMM GRANULOCYTES NFR BLD AUTO: 0.2 % (ref 0–0.9)
LYMPHOCYTES # BLD AUTO: 1.53 X10*3/UL (ref 1.2–4.8)
LYMPHOCYTES NFR BLD AUTO: 18 %
MAGNESIUM SERPL-MCNC: 1.5 MG/DL (ref 1.6–2.4)
MCH RBC QN AUTO: 31.7 PG (ref 26–34)
MCHC RBC AUTO-ENTMCNC: 32.1 G/DL (ref 32–36)
MCV RBC AUTO: 99 FL (ref 80–100)
MONOCYTES # BLD AUTO: 0.91 X10*3/UL (ref 0.1–1)
MONOCYTES NFR BLD AUTO: 10.7 %
NEUTROPHILS # BLD AUTO: 5.82 X10*3/UL (ref 1.2–7.7)
NEUTROPHILS NFR BLD AUTO: 68.5 %
NRBC BLD-RTO: 0 /100 WBCS (ref 0–0)
PHOSPHATE SERPL-MCNC: 2.9 MG/DL (ref 2.5–4.9)
PLATELET # BLD AUTO: 120 X10*3/UL (ref 150–450)
PMV BLD AUTO: 12.6 FL (ref 7.5–11.5)
POTASSIUM SERPL-SCNC: 3 MMOL/L (ref 3.5–5.3)
PROT SERPL-MCNC: 5.8 G/DL (ref 6.4–8.2)
RBC # BLD AUTO: 4.77 X10*6/UL (ref 4–5.2)
SODIUM SERPL-SCNC: 136 MMOL/L (ref 136–145)
WBC # BLD AUTO: 8.5 X10*3/UL (ref 4.4–11.3)

## 2023-10-22 PROCEDURE — 99291 CRITICAL CARE FIRST HOUR: CPT | Performed by: SURGERY

## 2023-10-22 PROCEDURE — 36415 COLL VENOUS BLD VENIPUNCTURE: CPT | Performed by: HOSPITALIST

## 2023-10-22 PROCEDURE — 99238 HOSP IP/OBS DSCHRG MGMT 30/<: CPT | Performed by: SURGERY

## 2023-10-22 PROCEDURE — 84100 ASSAY OF PHOSPHORUS: CPT | Performed by: HOSPITALIST

## 2023-10-22 PROCEDURE — 82947 ASSAY GLUCOSE BLOOD QUANT: CPT

## 2023-10-22 PROCEDURE — 83735 ASSAY OF MAGNESIUM: CPT | Performed by: HOSPITALIST

## 2023-10-22 PROCEDURE — 85025 COMPLETE CBC W/AUTO DIFF WBC: CPT | Performed by: HOSPITALIST

## 2023-10-22 PROCEDURE — 80053 COMPREHEN METABOLIC PANEL: CPT | Performed by: HOSPITALIST

## 2023-10-22 ASSESSMENT — PAIN SCALES - GENERAL
PAINLEVEL_OUTOF10: 0 - NO PAIN

## 2023-10-22 ASSESSMENT — COGNITIVE AND FUNCTIONAL STATUS - GENERAL
DAILY ACTIVITIY SCORE: 24
MOBILITY SCORE: 24

## 2023-10-22 ASSESSMENT — PAIN - FUNCTIONAL ASSESSMENT
PAIN_FUNCTIONAL_ASSESSMENT: 0-10
PAIN_FUNCTIONAL_ASSESSMENT: 0-10

## 2023-10-22 NOTE — NURSING NOTE
1005: AMA paperwork signed and witnessed.  Risks explained thoroughly by Dr. Ch and patient verbalizes understanding.  PIVs removed per protocol.  Patient awaiting ride.    1030: patient's ride here and at bedisde with patient.  All of patient's personal items are accounted for and in patient's possession.  Patient wheeled to lobby by her ride and left in private vehicle.

## 2023-10-22 NOTE — PROGRESS NOTES
Critical Care Medicine Progress Note    Consults    Subjective   Patient is a 53 y.o. female admitted on 10/21/2023  1:45 AM  with chief complaint of accidental overdose.     No acute events overnight.  As an update from yesterday's note, the patient was extubated well yesterday with no neurological findings.  Remains on 6  liters nasal cannula, however the patient is on 2 to 3 L at baseline at home.  Denies any other complaints, however is adamant she wants to leave the hospital today.    Past Medical History:   Diagnosis Date    Carpal tunnel syndrome, right upper limb     Carpal tunnel syndrome of right wrist    Cervical disc disorder with myelopathy, unspecified cervical region     Degeneration of cervical intervertebral disc with myelopathy    Encounter for screening for malignant neoplasm of cervix 11/21/2018    Screening for cervical cancer    Encounter for screening, unspecified 11/21/2018    Screening for condition    Personal history of other diseases of the circulatory system     History of cardiac disorder    Personal history of other diseases of the musculoskeletal system and connective tissue     History of fibromyalgia    Personal history of other diseases of the musculoskeletal system and connective tissue     History of chronic back pain    Personal history of other diseases of the nervous system and sense organs     History of restless legs syndrome    Personal history of other diseases of the respiratory system     History of chronic obstructive lung disease    Personal history of other mental and behavioral disorders     History of depression    Personal history of other specified conditions     History of headache    Personal history of pneumonia (recurrent) 10/10/2019    History of community acquired pneumonia    Radiculopathy, cervical region     Cervical radiculopathy at C6     Past Surgical History:   Procedure Laterality Date    OTHER SURGICAL HISTORY  11/07/2018    Hysterectomy      Medications Prior to Admission   Medication Sig Dispense Refill Last Dose    albuterol 90 mcg/actuation inhaler Inhale 4 times a day as needed for shortness of breath.       azithromycin (Zithromax) 250 mg tablet take 1 tablet (250 mg) by mouth every Monday, Wednesday, and Friday for COPD exacerbation prevention       clindamycin (Cleocin) 300 mg capsule Take 1 capsule (300 mg) by mouth in the morning and 1 capsule (300 mg) at noon and 1 capsule (300 mg) in the evening and 1 capsule (300 mg) before bedtime.       ipratropium-albuteroL (Duo-Neb) 0.5-2.5 mg/3 mL nebulizer solution Inhale 4 times a day. USE 1 VIAL VIA NEBULIZER       lidocaine (Lidoderm) 5 % patch 1 patch. APPLY TO THE AFFECTED AREA DAILY, LEAVE IN PLACE FOR 12 HOURS, THEN REMOVE AND LEAVE OFFF FOR 12 HOURS       methadone (Dolophine) 10 mg tablet Take 1 tablet (10 mg) by mouth once daily.       metoprolol succinate XL (Toprol-XL) 50 mg 24 hr tablet Take 1 tablet (50 mg) by mouth once daily.       nicotine polacrilex (Nicorette) 4 mg lozenge Dissolve 1 lozenge (4 mg) in the mouth every 2 hours if needed for smoking cessation. 72 lozenge 2     predniSONE (Deltasone) 5 mg tablet Take 1 tablet (5 mg) by mouth once daily.       pregabalin (Lyrica) 150 mg capsule Take 1 capsule (150 mg) by mouth in the morning and 1 capsule (150 mg) in the evening and 1 capsule (150 mg) before bedtime.       Stiolto Respimat 2.5-2.5 mcg/actuation mist inhaler inhale 2 puffs by inhalation route once daily at the same time each day       tiotropium (Spiriva Respimat) 2.5 mcg/actuation inhaler Inhale 2 puffs once daily.        Hydrocodone, Hydrocodone-acetaminophen, Penicillins, and Tramadol  Social History     Tobacco Use    Smoking status: Every Day     Packs/day: 1.00     Years: 15.00     Additional pack years: 0.00     Total pack years: 15.00     Types: Cigarettes    Smokeless tobacco: Never   Substance Use Topics    Alcohol use: Not Currently     Family History    Problem Relation Name Age of Onset    Cancer Mother      Diabetes Mother          OTHER TYPE WITH ORAL COMPLICATION, WITHOUT LONG-TERM CURRENT USE OF INSULIN    Stroke Father      Other (CARDIAC DISORDER) Father      Hypertension Sister      Hypertension Brother      Diabetes Brother          OTHER TYPE WITH ORAL COMPLICATION, WITHOUT LONG-TERM CURRENT USE OF INSULIN    Cancer Maternal Grandmother      Other (CARDIAC DISORDER) Paternal Grandmother      Heart attack Other AUNT     Cancer Other UNCLE     Other (CARDIAC DISORDER) Other UNCLE        Scheduled Medications:   chlorhexidine, 15 mL, Mouth/Throat, q4h CHRIS  enoxaparin, 40 mg, subcutaneous, q24h  famotidine, 20 mg, oral, BID   Or  famotidine, 20 mg, intravenous, BID  insulin lispro, 0-5 Units, subcutaneous, q6h         Continuous Medications:   sodium chloride 0.9%, 50 mL/hr, Last Rate: Stopped (10/22/23 0920)         PRN Medications:   PRN medications: dextrose 10 % in water (D10W), dextrose, glucagon    Review of Systems     Physical Exam    Objective   Vitals:  Most Recent:  Vitals:    10/22/23 0852   BP: 140/73   Pulse: (!) 112   Resp: 18   Temp:    SpO2: 94%       24hr Min/Max:  Temp  Min: 36.2 °C (97.2 °F)  Max: 36.7 °C (98.1 °F)  Pulse  Min: 80  Max: 120  BP  Min: 64/51  Max: 149/86  Resp  Min: 16  Max: 34  SpO2  Min: 91 %  Max: 99 %    LDA:          Vent settings:  Vent Mode: Spontaneous  FiO2 (%):  [35 %] 35 %  S RR:  [24] 24  S VT:  [450 mL] 450 mL  PEEP/CPAP (cm H2O):  [5 cm H20] 5 cm H20  KY SUP:  [10 cm H20] 10 cm H20  MAP (cm H2O):  [8-14] 8    Hemodynamic parameters for last 24 hours:         Intake/Output Summary (Last 24 hours) at 10/22/2023 1100  Last data filed at 10/22/2023 1037  Gross per 24 hour   Intake 1659.15 ml   Output 1350 ml   Net 309.15 ml           Physical exam:    General appearance:  NAD, AAx3  Head:  Normocephalic, without obvious abnormality  Neck:  no JVD and supple, symmetrical, trachea midline  Lungs:  clear to  auscultation bilaterally, on 6L NC  Heart: regular rate and rhythm, S1, S2 normal, no murmur, click, rub or gallop  Abdomen:  soft, non-tender; bowel sounds normal; no masses,  no organomegaly  Extremities: Non-edematous  Pulses: 2+ and symmetric  Neurologic:  Alert and oriented X 3, normal strength and tone. Normal symmetric reflexes. Normal coordination and gait    Lab/Radiology/Diagnostic Review:  Results for orders placed or performed during the hospital encounter of 10/21/23 (from the past 24 hour(s))   POCT GLUCOSE   Result Value Ref Range    POCT Glucose 98 74 - 99 mg/dL   Methadone Screen, Urine   Result Value Ref Range    Methadone Screen, Urine Presumptive Positive (A) Presumptive Negative   POCT GLUCOSE   Result Value Ref Range    POCT Glucose 89 74 - 99 mg/dL   POCT GLUCOSE   Result Value Ref Range    POCT Glucose 108 (H) 74 - 99 mg/dL   POCT GLUCOSE   Result Value Ref Range    POCT Glucose 121 (H) 74 - 99 mg/dL   CBC and Auto Differential   Result Value Ref Range    WBC 8.5 4.4 - 11.3 x10*3/uL    nRBC 0.0 0.0 - 0.0 /100 WBCs    RBC 4.77 4.00 - 5.20 x10*6/uL    Hemoglobin 15.1 12.0 - 16.0 g/dL    Hematocrit 47.1 (H) 36.0 - 46.0 %    MCV 99 80 - 100 fL    MCH 31.7 26.0 - 34.0 pg    MCHC 32.1 32.0 - 36.0 g/dL    RDW 12.3 11.5 - 14.5 %    Platelets 120 (L) 150 - 450 x10*3/uL    MPV 12.6 (H) 7.5 - 11.5 fL    Neutrophils % 68.5 40.0 - 80.0 %    Immature Granulocytes %, Automated 0.2 0.0 - 0.9 %    Lymphocytes % 18.0 13.0 - 44.0 %    Monocytes % 10.7 2.0 - 10.0 %    Eosinophils % 2.0 0.0 - 6.0 %    Basophils % 0.6 0.0 - 2.0 %    Neutrophils Absolute 5.82 1.20 - 7.70 x10*3/uL    Immature Granulocytes Absolute, Automated 0.02 0.00 - 0.70 x10*3/uL    Lymphocytes Absolute 1.53 1.20 - 4.80 x10*3/uL    Monocytes Absolute 0.91 0.10 - 1.00 x10*3/uL    Eosinophils Absolute 0.17 0.00 - 0.70 x10*3/uL    Basophils Absolute 0.05 0.00 - 0.10 x10*3/uL   Comprehensive metabolic panel   Result Value Ref Range    Glucose 74 74  - 99 mg/dL    Sodium 136 136 - 145 mmol/L    Potassium 3.0 (L) 3.5 - 5.3 mmol/L    Chloride 95 (L) 98 - 107 mmol/L    Bicarbonate 37 (H) 21 - 32 mmol/L    Anion Gap 7 (L) 10 - 20 mmol/L    Urea Nitrogen 9 6 - 23 mg/dL    Creatinine 0.40 (L) 0.50 - 1.05 mg/dL    eGFR >90 >60 mL/min/1.73m*2    Calcium 8.4 (L) 8.6 - 10.3 mg/dL    Albumin 3.2 (L) 3.4 - 5.0 g/dL    Alkaline Phosphatase 50 33 - 110 U/L    Total Protein 5.8 (L) 6.4 - 8.2 g/dL    AST 40 (H) 9 - 39 U/L    Bilirubin, Total 0.8 0.0 - 1.2 mg/dL    ALT 29 7 - 45 U/L   Magnesium   Result Value Ref Range    Magnesium 1.50 (L) 1.60 - 2.40 mg/dL   Phosphorus   Result Value Ref Range    Phosphorus 2.9 2.5 - 4.9 mg/dL   POCT GLUCOSE   Result Value Ref Range    POCT Glucose 114 (H) 74 - 99 mg/dL     CT angio chest for pulmonary embolism    Result Date: 10/21/2023  Interpreted By:  Alida Abbott, STUDY: CT ANGIO CHEST FOR PULMONARY EMBOLISM;  10/21/2023 5:00 am   INDICATION: Signs/Symptoms:Respiratory distress.   COMPARISON: 12/13/2022   ACCESSION NUMBER(S): [id number]   ORDERING CLINICIAN: ANNE KRISHNAMURTHY   TECHNIQUE: Axial CT images of the chest obtained  after intravenous administration of contrast. Maximum intensity projection images were created and reviewed.   FINDINGS: VESSELS: No aortic aneurysm. No pulmonary embolism. HEART: Normal size.  No pericardial effusion. MEDIASTINUM AND PEDRO: No pathologically enlarged lymph nodes. LUNG, PLEURA, AND LARGE AIRWAYS: No pleural effusion or pneumothorax. No pulmonary consolidation or suspicious pulmonary nodule. Severe emphysema. Right apical calcified granuloma. The endotracheal tube terminates between the clavicular heads and aortic arch. CHEST WALL AND LOWER NECK: Within normal limits.   UPPER ABDOMEN: No acute abnormality of the visualized abdomen. Splenic calcified granulomas noted. Nasogastric tube extends into the gastric body. Low attenuation of the liver suggestive of hepatic steatosis.   BONES: No acute  osseous abnormality.       No pulmonary embolism or acute cardiopulmonary process.   Emphysema.   MACRO: None   Signed by: Alida Abbott 10/21/2023 5:41 AM Dictation workstation:   [id number]    CT head wo IV contrast    Result Date: 10/21/2023  Interpreted By:  Alida Abbott, STUDY: CT HEAD WO IV CONTRAST;  10/21/2023 4:59 am   INDICATION: Signs/Symptoms:Altered mental status.   COMPARISON: None.   ACCESSION NUMBER(S): [id number]   ORDERING CLINICIAN: ANNE KRISHNAMURTHY   TECHNIQUE: Axial noncontrast CT images of the head.   FINDINGS: BRAIN PARENCHYMA: There is generalized effacement of cerebral sulci, suggesting cerebral edema. Gray-white matter interfaces are preserved. No mass effect or midline shift.   HEMORRHAGE: No acute intracranial hemorrhage. VENTRICLES and EXTRA-AXIAL SPACES: The ventricles and sulci are within normal limits in size for brain volume. No abnormal extraaxial fluid collection. EXTRACRANIAL SOFT TISSUES: Within normal limits. PARANASAL SINUSES/MASTOIDS: Scattered opacification of the ethmoid air cells. The visualized paranasal sinuses and mastoid air cells are aerated. CALVARIUM: No depressed skull fracture. No destructive osseous lesion.   OTHER FINDINGS: None.       Generalized sulcal effacement suggestive of diffuse cerebral edema. Gray-white differentiation is grossly preserved, however, early or mild hypoxic ischemic encephalopathy is not excluded. Consider short-term follow-up CT.   No acute intracranial hemorrhage or midline shift.     MACRO: None   Signed by: Alida Abbott 10/21/2023 5:35 AM Dictation workstation:   [id number]    XR chest 1 view    Result Date: 10/21/2023  Interpreted By:  Amanda Braun, STUDY: XR CHEST 1 VIEW;  10/21/2023 3:24 am   INDICATION: Signs/Symptoms:Postintubation.   COMPARISON: 12/13/2022, 10/21/2023   ACCESSION NUMBER(S): [id number]   ORDERING CLINICIAN: ANNE KRISHNAMURTHY   FINDINGS: AP and too low energy AP portable supine imaging of the chest.    Interval endotracheal tube placement which terminates approximately 6 cm above the aida.   Enteric tube courses below the diaphragm with side port noted below the diaphragm in the left upper quadrant. Tip not visualized on current imaging.   CARDIOMEDIASTINAL SILHOUETTE: Cardiomediastinal silhouette is normal in size and configuration.   LUNGS: Lungs appear mildly hyperinflated. Coarse nodular opacities overlie the right lung apex possibly calcified granuloma. Mild nonspecific interstitial prominence and blunting of the left costophrenic angle. Otherwise no sizable pleural effusion, consolidation or pneumothorax identified.   ABDOMEN: No remarkable upper abdominal findings.   BONES: No acute osseous changes.       1.  Hyperinflated lungs suggestive of emphysema or COPD. 2. Medical devices as detailed. 2 cm advancement of the endotracheal tube may be considered.       MACRO: None   Signed by: Amanda Braun 10/21/2023 3:35 AM Dictation workstation:   [id number]    XR chest 1 view    Result Date: 10/21/2023  Interpreted By:  Amanda Braun, STUDY: XR CHEST 1 VIEW;  10/21/2023 2:40 am   INDICATION: Signs/Symptoms:Aspiration.   COMPARISON: 12/13/2022   ACCESSION NUMBER(S): [id number]   ORDERING CLINICIAN: ANNE KRISHNAMURTHY   FINDINGS: AP radiograph of the chest was provided.       CARDIOMEDIASTINAL SILHOUETTE: Cardiomediastinal silhouette is normal in size and configuration.   LUNGS: Lung fields are hyperinflated. Coarse interstitial opacities noted. Right upper lobe dense nodule wall possibly calcified similar to prior imaging. Mild blunting of the left costophrenic angle and atelectasis. No focal consolidation, pleural effusion or sizable pneumothorax seen.   ABDOMEN: No remarkable upper abdominal findings.   BONES: No acute osseous changes.       1.  Hyperinflated lungs suggestive of emphysema or COPD. Interstitial opacities which may be chronic however superimposed edema or infection not excluded.       MACRO:  "None   Signed by: Amanda Braun 10/21/2023 2:43 AM Dictation workstation:   [id number]      Additional Labs:  SCVO2: No results found for: \"[id number]\"    Assessment & Recommendation  Principal Problem:    Opiate overdose, accidental or unintentional, initial encounter (CMS/[facility])  Active Problems:    Acute respiratory failure with hypoxia (CMS/[facility])      53-year-old female with accidental overdose of fentanyl, initial CT head concern for cerebral edema, however patient had very quick recovery with extubation and no neurologic deficits.      Neuro  Continue every hour neurochecks  No pain, will restart home neurologic medications and methadone at home dose    Cardiovascular  Hemodynamically stable, no need for any intervention at this time continue to monitor    Pulm  On 6 L nasal cannula, she normally on oxygen at home at 2 to 3 L nasal cannula, wean as tolerated    GI  Transition to regular diet      Replete electrolytes as necessary  Monitor daily BMP    ID  No signs of infection, continue to monitor    Heme  On Lovenox    Lines  Peripheral IV  Grant which can be removed today    Dispo  I would ideally like the patient to remain hospitalized for at least 24 to 48 hours more to ensure there is no progression of her neurologic signs given the fact she did have some cerebral edema and cerebral edema can maximize and worsen for the first 72 hours.  That being said I had a long discussion with the patient today about the risks of her leaving including progression of neurologic damage, respiratory insufficiency, and even death.  The patient was still adamant that she wanted to leave the hospital regardless of my recommendations of which she did on her own accord    I spent 38 minutes in the professional and overall care of this patient.  Camden Ch MD  10/22/23    "

## 2023-10-22 NOTE — NURSING NOTE
Patient is requesting to leave AMA.  Patient is requring 4L-6L nasal cannula to maintain O2 saturation, her baseline at home is 3L.  Educated patient that she is requiring more oxygen than her baseline, patient verbalized understanding and remains insistent on leaving today.  Dr. Ch at bedside discussing this with patient.

## 2023-10-23 ENCOUNTER — PATIENT OUTREACH (OUTPATIENT)
Dept: PRIMARY CARE | Facility: CLINIC | Age: 53
End: 2023-10-23
Payer: MEDICAID

## 2023-10-23 NOTE — PROGRESS NOTES
Discharge Facility: [facility]  Discharge Diagnosis:  Acute respiratory failure with hypoxia; Somnolence; accidental overdose  Admission Date: 10/21/2023  Discharge Date: 10/22/2023    PCP Appointment Date: 10/31/2023  Specialist Appointment Date: D  Hospital Encounter and Summary: Linked  See discharge assessment below for further details  Engagement  Call Start Time: 1321 (10/23/2023  1:25 PM)    Medications  Medications reviewed with patient/caregiver?: Yes (10/23/2023  1:25 PM)  Is the patient having any side effects they believe may be caused by any medication additions or changes?: No (10/23/2023  1:25 PM)  Does the patient have all medications ordered at discharge?: Yes (10/23/2023  1:25 PM)  Care Management Interventions: No intervention needed (10/23/2023  1:25 PM)  Prescription Comments: see med list (no med changes) (10/23/2023  1:25 PM)  Is the patient taking all medications as directed (includes completed medication regime)?: Yes (10/23/2023  1:25 PM)    Appointments  Does the patient have a primary care provider?: Yes (10/23/2023  1:25 PM)  Care Management Interventions: Verified appointment date/time/provider (10/23/2023  1:25 PM)  Has the patient kept scheduled appointments due by today?: Yes (10/23/2023  1:25 PM)  Care Management Interventions: Advised patient to keep appointment (10/23/2023  1:25 PM)    Self Management  What is the home health agency?: denies need-confirms she has supports at home if needed (10/23/2023  1:25 PM)  What Durable Medical Equipment (DME) was ordered?: home oxygen2-3LNC (10/23/2023  1:25 PM)  Has all Durable Medical Equipment (DME) been delivered?: Yes (10/23/2023  1:25 PM)    Patient Teaching  Does the patient have access to their discharge instructions?: Yes (10/23/2023  1:25 PM)  Care Management Interventions: Reviewed instructions with patient (10/23/2023  1:25 PM)  What is the patient's perception of their health status since discharge?: Returned to  baseline/stable (10/23/2023  1:25 PM)  Is the patient/caregiver able to teach back the hierarchy of who to call/visit for symptoms/problems? PCP, Specialist, Home Health nurse, Urgent Care, ED, 911: Yes (10/23/2023  1:25 PM)  Patient/Caregiver Education Comments: Patient states she is doing well and denies SOB outside of basline at time of outreach call. States she continues on her home O2 at 2LNC. (10/23/2023  1:25 PM)

## 2023-10-23 NOTE — DISCHARGE SUMMARY
Discharge Diagnosis  Opiate overdose, accidental or unintentional, initial encounter (CMS/[facility])    Issues Requiring Follow-Up  She needs to follow up with her primary care physicians, however unable to schedule due to her leaving against medical advice    Test Results Pending At Discharge  Pending Labs       Order Current Status    Methadone Confirmation, Urine In process    OOB Internal Tracking In process    Blood Culture Preliminary result    Blood Culture Preliminary result            Hospital Course   Initially had poor mental status with a GCS of less than 8, however she rapidly improved to an 11 T.  She was extubated and on 6-8 L nasal cannula.  The plan was to monitor in the hospital to ensure that she did not have any recurrent neurologic symptoms due to concern for some cerebral edema on CT scan and also to wean some oxygen and given her baseline oxygen requirements were below which she was on at the time, however the patient insisted on leaving AGAINST MEDICAL ADVICE.  As documented in my other note, I explained the risk on benefits, alternatives but she still insisted on leaving AGAINST MEDICAL ADVICE.    Pertinent Physical Exam At Time of Discharge  Physical Exam    Home Medications     Medication List      ASK your doctor about these medications     albuterol 90 mcg/actuation inhaler   azithromycin 250 mg tablet; Commonly known as: Zithromax   clindamycin 300 mg capsule; Commonly known as: Cleocin   ipratropium-albuteroL 0.5-2.5 mg/3 mL nebulizer solution; Commonly known   as: Duo-Neb   lidocaine 5 % patch; Commonly known as: Lidoderm   methadone 10 mg tablet; Commonly known as: Dolophine   metoprolol succinate XL 50 mg 24 hr tablet; Commonly known as: Toprol-XL   nicotine polacrilex 4 mg lozenge; Commonly known as: Nicorette; Dissolve   1 lozenge (4 mg) in the mouth every 2 hours if needed for smoking   cessation.   predniSONE 5 mg tablet; Commonly known as: Deltasone   pregabalin 150 mg capsule;  Commonly known as: Lyrica   Spiriva Respimat 2.5 mcg/actuation inhaler; Generic drug: tiotropium   Stiolto Respimat 2.5-2.5 mcg/actuation mist inhaler; Generic drug:   tiotropium-olodateroL       Outpatient Follow-Up  Future Appointments   Date Time Provider Department Center   10/30/2023  2:00 PM [facility] CT 2 [facility]   10/31/2023  4:00 PM [email or web address] [place]       Camden Ch MD  10/23/23  11:30 AM

## 2023-10-25 LAB
BACTERIA BLD CULT: NORMAL
BACTERIA BLD CULT: NORMAL
EDDP UR CFM-MCNC: >1000 NG/ML
METHADONE UR CFM-MCNC: >1000 NG/ML

## 2023-10-30 ENCOUNTER — APPOINTMENT (OUTPATIENT)
Dept: RADIOLOGY | Facility: HOSPITAL | Age: 53
End: 2023-10-30
Payer: MEDICAID

## 2023-10-31 ENCOUNTER — APPOINTMENT (OUTPATIENT)
Dept: PRIMARY CARE | Facility: CLINIC | Age: 53
End: 2023-10-31
Payer: MEDICAID

## 2023-11-02 ENCOUNTER — PATIENT OUTREACH (OUTPATIENT)
Dept: PRIMARY CARE | Facility: CLINIC | Age: 53
End: 2023-11-02
Payer: MEDICAID

## 2023-11-02 NOTE — PROGRESS NOTES
Call regarding after hospitalization.  At time of outreach call the patient feels as if their condition has improved since last visit. Patient states she is doing well. No c/o SOB during outreach call. Has family with her if support is needed.

## 2023-11-15 NOTE — PROGRESS NOTES
Subjective   Patient ID: Radha Locke is a 53 y.o. female who presents for No chief complaint on file..  HPI    Discharge Facility: [facility] [place]  Discharge Diagnosis:  Acute respiratory failure with hypoxia; Somnolence; accidental overdose  Admission Date: 10/21/2023  Discharge Date: 10/22/2023  Not prescribed any new medications  Patient was advised to follow up with PCP.      On 10-21-23 was intubated. Notes state:  Chief complaint unresponsive  History of present illness this is a 53-year-old female who has a history of previous stroke.  Apparently she was in the bathroom become unresponsive she takes both methadone and occasionally she abuses heroin.  She was given Narcan and was brought here for assessment.  Came to there was no stroke symptoms.  Her pulse of 152 blood pressure 133/77 O2 saturation 93% respiratory rate 19                 Taking current medications which were reviewed.  Problem list discussed.    Overall doing well.  Eating okay.  Staying active.    Has no other new problem /question.     ROS  Constitutional- No activity change. No appetite change.  Eyes- Denies vision changes.  Respiratory- No shortness of breath.  Cardiovascular- No palpitations. No chest pain.  GI- No nausea or vomiting. No diarrhea or constipation. Denies abdominal pain.  Musculoskeletal- Denies joint swelling.  Extremities- No edema.  Neurological- Denies headaches. Denies dizziness.  Skin- No rashes.  Psychiatric/Behavioral- Denies significant anxiety, or depressed mood.     Objective     There were no vitals taken for this visit.    Allergies   Allergen Reactions    Hydrocodone Unknown    Hydrocodone-Acetaminophen Unknown    Penicillins Unknown    Tramadol Unknown       Constitutional-- Well-nourished.  No distress  Head- unremarkable.  Ears- TMs clear.  Eyes- PERRL.  Conjunctiva normal.  Nose- Normal.  No rhinorrhea noted.  Throat- Oropharynx is clear and moist.  Neck- Supple with no thyromegaly.  No  significant cervical adenopathy noted.  Pulmonary/Chest- Breath sounds normal with normal effort.  No wheezing.  Heart- Regular rate and rhythm.  No murmur.  Abdomen- Soft and non-tender.  No masses noted.  Musculoskeletal- Normal ROM.  No significant joint swelling  Extremities- No edema.   Neurological- Alert.  No noted deficits.  Skin- Warm.  No rashes.  Psychiatric/Behavioral- Mood and affect normal.  Behavior normal.     Assessment/Plan   No diagnosis found.       Long talk. Treatment options reviewed.    Continue and take your medications as prescribed.    Health Maintenance issues discussed.    Importance of healthy diet and regular exercise regimen discussed.    We will contact you with any test results ordered. If you do not hear from us, please contact.    Follow-up as instructed or sooner if any problems or symptoms do not resolve as expected.

## 2023-11-17 ENCOUNTER — APPOINTMENT (OUTPATIENT)
Dept: PRIMARY CARE | Facility: CLINIC | Age: 53
End: 2023-11-17
Payer: MEDICAID

## 2023-12-21 ENCOUNTER — PATIENT OUTREACH (OUTPATIENT)
Dept: PRIMARY CARE | Facility: CLINIC | Age: 53
End: 2023-12-21
Payer: MEDICAID

## 2024-01-18 ENCOUNTER — PATIENT OUTREACH (OUTPATIENT)
Dept: PRIMARY CARE | Facility: CLINIC | Age: 54
End: 2024-01-18
Payer: MEDICAID